# Patient Record
Sex: FEMALE | Race: ASIAN | NOT HISPANIC OR LATINO | Employment: OTHER | URBAN - METROPOLITAN AREA
[De-identification: names, ages, dates, MRNs, and addresses within clinical notes are randomized per-mention and may not be internally consistent; named-entity substitution may affect disease eponyms.]

---

## 2023-11-22 ENCOUNTER — HOSPITAL ENCOUNTER (OUTPATIENT)
Dept: MAMMOGRAPHY | Facility: HOSPITAL | Age: 48
Discharge: HOME | End: 2023-11-22
Admitting: STUDENT IN AN ORGANIZED HEALTH CARE EDUCATION/TRAINING PROGRAM
Payer: COMMERCIAL

## 2023-11-22 DIAGNOSIS — R92.8 ABNORMAL MAMMOGRAM: ICD-10-CM

## 2023-11-22 PROCEDURE — 77065 DX MAMMO INCL CAD UNI: CPT | Performed by: RADIOLOGY

## 2023-11-22 PROCEDURE — 77065 DX MAMMO INCL CAD UNI: CPT

## 2024-01-24 ENCOUNTER — TELEPHONE (OUTPATIENT)
Dept: OBSTETRICS AND GYNECOLOGY | Facility: CLINIC | Age: 49
End: 2024-01-24
Payer: COMMERCIAL

## 2024-01-24 RX ORDER — METRONIDAZOLE 500 MG/1
500 TABLET ORAL 2 TIMES DAILY
Qty: 14 TABLET | Refills: 0 | Status: SHIPPED | OUTPATIENT
Start: 2024-01-24 | End: 2024-01-31

## 2024-01-24 NOTE — TELEPHONE ENCOUNTER
Caller: Judith Sandoval    Relationship: Self    Best call back number: [phone]    Requested Prescriptions:   metroNIDAZOLE (Flagyl) 500 MG tablet      Pharmacy where request should be sent:      [id number] [place] [id number]  AT [facility] - [phone] [facility] [phone]  [phone]     Last office visit with prescribing clinician: 7/20/2023   Last telemedicine visit with prescribing clinician: Visit date not found   Next office visit with prescribing clinician: 2/6/2024     Additional details provided by patient:     Does the patient have less than a 3 day supply:  [x] Yes  [] No    Would you like a call back once the refill request has been completed: [x] Yes [] No    If the office needs to give you a call back, can they leave a voicemail: [x] Yes [] No    Amanda Toro Rep   01/24/24 08:54 EST

## 2024-01-24 NOTE — TELEPHONE ENCOUNTER
Called and spoke with patient.  She states that her symptoms have been present for ~1 week.  She reports she is having abnormal vaginal discharge and odor (is of fishy quality).   No vaginal itching/irritation, no urinary changes/symptoms, no recent product changes, no recent antibiotics.  Confirmed [facility] on [facility] pharmacy.  Patient would like to know if something can be called in for her.

## 2024-01-24 NOTE — TELEPHONE ENCOUNTER
Requested prescription of Flagyl twice a day for 1 week for BV has been sent to her listed pharmacy.  Thanks,  Jackie Ross MD

## 2024-01-24 NOTE — TELEPHONE ENCOUNTER
Called and spoke with patient, informed her of Rx, she verified understanding. Reminded to avoid alcohol while on medication.

## 2024-02-05 NOTE — PROGRESS NOTES
Subjective   Chief Complaint   Patient presents with    Gynecologic Exam    recurrent yeast      Would like swab      Judith Sandoval is a 48 y.o. year old  presenting to be seen for her annual exam. She is doing well but would like a vaginal swab due to recurrent BV. She finished her regimen of Flagyl about a week ago. She has also been taking the weekly Diflucan for recurrent yeast infections, but felt minimal improvement. Her last dose of Diflucan was about 5 days ago. She reports some intermenstrual spotting that is not consistent, and happens sporadically. She would also like to leave a urine sample for UTI evaluation.     Follow-up mammogram scheduled for March  Up-to-date on colonoscopy    SEXUAL Hx:  She is currently sexually active.  In the past year there there has been NO new sexual partners.    She would like to be screened for STD's at today's exam.  Current birth control method: tubal sterilization.  She is happy with her current method of contraception and does not want to discuss alternative methods of contraception.  MENSTRUAL Hx:  No LMP recorded. (Menstrual status: Tubal ligation).  In the past 6 months her cycles have been regular, predictable and occur monthly.  Her menstrual flow is typically normal.   Each month on average there are roughly 2 day(s) of very heavy flow.  Intermenstrual bleeding is present - see HPI .    Post-coital bleeding is absent.  Dysmenorrhea: Mild   PMS: none  Her cycles are not a source of concern for her that she wishes to discuss today.  HEALTH Hx:  She exercises regularly: yes.  She wears her seat belt: yes.  She has concerns about domestic violence: no.    The following portions of the patient's history were reviewed and updated as appropriate:problem list, current medications, allergies, past family history, past medical history, past social history, and past surgical history.    Social History    Tobacco Use      Smoking status: Former         "Packs/day: .25        Types: Cigarettes        Quit date: 2016        Years since quittin.2      Smokeless tobacco: Never      Tobacco comments: smokes e cigarette         Objective   /72 (BP Location: Right arm, Patient Position: Sitting, Cuff Size: Adult)   Ht 165.1 cm (65\")   Wt 60.7 kg (133 lb 12.8 oz)   BMI 22.27 kg/m²     General:  well developed; well nourished  no acute distress   Skin:  No suspicious lesions seen   Thyroid: not examined   Breasts:  Examined in supine position  Symmetric without masses or skin dimpling  Nipples normal without inversion, lesions or discharge  There are no palpable axillary nodes   Pelvis: Clinical staff was present for exam  External genitalia:  normal appearance of the external genitalia including Bartholin's and Flasher's glands.  :  urethral meatus normal;  Vaginal:  normal pink mucosa without prolapse or lesions.  Cervix:  normal appearance.  Uterus:  normal size, shape and consistency.  Adnexa:  normal bimanual exam of the adnexa.        Assessment   Normal GYN exam  Vaginal discharge   Recurrent BV      Plan   Pap and HPV were done today.  If she does not receive the results of the Pap within 2 weeks  time, she was instructed to call to find out the results.  I explained to Judith that the recommendations for Pap smear interval in a low risk patient's has lengthened to 5 years time if both cytology and HPV testing were normal.  I encouraged her to be seen yearly for a full physical exam including breast and pelvic exam even during the off years when PAP's will not be performed.   She was encouraged to get yearly mammograms.  She should report any palpable breast lump(s) or skin changes regardless of mammographic findings.  I explained to Judith that notification regarding her mammogram results will come from the center performing the study.  Our office will not be routinely calling with mammogram results.  It is her responsibility to make sure that the " results from the mammogram are communicated to her by the breast center.  If she has any questions about the results, she is welcome to call our office anytime.  Leukorrhea swab collected and STD screening performed for vaginal discharge.  Will call patient with any abnormal results, and treat accordingly.  [place] information and handout given in clinic for BV prevention.   UA with reflex culture ordered per patient request.   Discussed if periods become heavier more frequent, to call the office for repeat evaluation.  Patient voiced understanding.  The importance of keeping all planned follow-up and taking all medications as prescribed was emphasized.  Follow up for annual exam in 1 year or sooner PRN     No orders of the defined types were placed in this encounter.         This note was electronically signed.    Jackie Ross MD   February 6, 2024

## 2024-02-06 ENCOUNTER — OFFICE VISIT (OUTPATIENT)
Dept: OBSTETRICS AND GYNECOLOGY | Facility: CLINIC | Age: 49
End: 2024-02-06
Payer: COMMERCIAL

## 2024-02-06 VITALS
BODY MASS INDEX: 22.29 KG/M2 | HEIGHT: 65 IN | SYSTOLIC BLOOD PRESSURE: 112 MMHG | DIASTOLIC BLOOD PRESSURE: 72 MMHG | WEIGHT: 133.8 LBS

## 2024-02-06 DIAGNOSIS — N89.8 VAGINAL DISCHARGE: ICD-10-CM

## 2024-02-06 DIAGNOSIS — Z01.419 WOMEN'S ANNUAL ROUTINE GYNECOLOGICAL EXAMINATION: Primary | ICD-10-CM

## 2024-02-06 DIAGNOSIS — Z01.419 PAP TEST, AS PART OF ROUTINE GYNECOLOGICAL EXAMINATION: ICD-10-CM

## 2024-02-06 LAB — HOLD SPECIMEN: NORMAL

## 2024-02-06 PROCEDURE — 99396 PREV VISIT EST AGE 40-64: CPT | Performed by: STUDENT IN AN ORGANIZED HEALTH CARE EDUCATION/TRAINING PROGRAM

## 2024-02-06 PROCEDURE — 81003 URINALYSIS AUTO W/O SCOPE: CPT | Performed by: STUDENT IN AN ORGANIZED HEALTH CARE EDUCATION/TRAINING PROGRAM

## 2024-02-06 PROCEDURE — 99213 OFFICE O/P EST LOW 20 MIN: CPT | Performed by: STUDENT IN AN ORGANIZED HEALTH CARE EDUCATION/TRAINING PROGRAM

## 2024-02-06 PROCEDURE — 1159F MED LIST DOCD IN RCRD: CPT | Performed by: STUDENT IN AN ORGANIZED HEALTH CARE EDUCATION/TRAINING PROGRAM

## 2024-02-06 PROCEDURE — 1160F RVW MEDS BY RX/DR IN RCRD: CPT | Performed by: STUDENT IN AN ORGANIZED HEALTH CARE EDUCATION/TRAINING PROGRAM

## 2024-02-06 PROCEDURE — 99459 PELVIC EXAMINATION: CPT | Performed by: STUDENT IN AN ORGANIZED HEALTH CARE EDUCATION/TRAINING PROGRAM

## 2024-02-07 LAB
BILIRUB UR QL STRIP: NEGATIVE
CLARITY UR: CLEAR
COLOR UR: YELLOW
GLUCOSE UR STRIP-MCNC: NEGATIVE MG/DL
HGB UR QL STRIP.AUTO: NEGATIVE
KETONES UR QL STRIP: NEGATIVE
LEUKOCYTE ESTERASE UR QL STRIP.AUTO: NEGATIVE
NITRITE UR QL STRIP: NEGATIVE
PH UR STRIP.AUTO: 6.5 [PH] (ref 5–8)
PROT UR QL STRIP: ABNORMAL
SP GR UR STRIP: 1.03 (ref 1–1.03)
UROBILINOGEN UR QL STRIP: ABNORMAL

## 2024-02-09 LAB — REF LAB TEST METHOD: NORMAL

## 2024-03-05 RX ORDER — SUMATRIPTAN 100 MG/1
TABLET, FILM COATED ORAL
Qty: 9 TABLET | Refills: 11 | Status: SHIPPED | OUTPATIENT
Start: 2024-03-05

## 2024-03-11 ENCOUNTER — HOSPITAL ENCOUNTER (OUTPATIENT)
Dept: MAMMOGRAPHY | Facility: HOSPITAL | Age: 49
Discharge: HOME | End: 2024-03-11
Admitting: RADIOLOGY
Payer: COMMERCIAL

## 2024-03-11 DIAGNOSIS — R92.8 ABNORMAL MAMMOGRAM: ICD-10-CM

## 2024-03-11 DIAGNOSIS — R92.8 ABNORMAL MAMMOGRAM: Primary | ICD-10-CM

## 2024-03-11 PROCEDURE — G0279 TOMOSYNTHESIS, MAMMO: HCPCS

## 2024-03-11 PROCEDURE — 77066 DX MAMMO INCL CAD BI: CPT

## 2024-03-11 PROCEDURE — 77062 BREAST TOMOSYNTHESIS BI: CPT | Performed by: RADIOLOGY

## 2024-03-11 PROCEDURE — 77066 DX MAMMO INCL CAD BI: CPT | Performed by: RADIOLOGY

## 2024-03-28 ENCOUNTER — ANESTHESIA (OUTPATIENT)
Dept: PERIOP | Facility: HOSPITAL | Age: 49
End: 2024-03-28
Payer: COMMERCIAL

## 2024-03-28 ENCOUNTER — HOSPITAL ENCOUNTER (OUTPATIENT)
Facility: HOSPITAL | Age: 49
Setting detail: OBSERVATION
Discharge: HOME | End: 2024-03-29
Attending: EMERGENCY MEDICINE | Admitting: INTERNAL MEDICINE
Payer: COMMERCIAL

## 2024-03-28 ENCOUNTER — APPOINTMENT (OUTPATIENT)
Dept: GENERAL RADIOLOGY | Facility: HOSPITAL | Age: 49
End: 2024-03-28
Payer: COMMERCIAL

## 2024-03-28 ENCOUNTER — APPOINTMENT (OUTPATIENT)
Dept: CT IMAGING | Facility: HOSPITAL | Age: 49
End: 2024-03-28
Payer: COMMERCIAL

## 2024-03-28 ENCOUNTER — ANESTHESIA EVENT (OUTPATIENT)
Dept: PERIOP | Facility: HOSPITAL | Age: 49
End: 2024-03-28
Payer: COMMERCIAL

## 2024-03-28 DIAGNOSIS — N75.1 BARTHOLIN'S GLAND ABSCESS: ICD-10-CM

## 2024-03-28 DIAGNOSIS — N23 RENAL COLIC ON LEFT SIDE: ICD-10-CM

## 2024-03-28 DIAGNOSIS — N20.1 URETERAL STONE: Primary | ICD-10-CM

## 2024-03-28 DIAGNOSIS — R52 INTRACTABLE PAIN: ICD-10-CM

## 2024-03-28 DIAGNOSIS — N20.1 URETEROLITHIASIS: ICD-10-CM

## 2024-03-28 LAB
ALBUMIN SERPL-MCNC: 4.4 G/DL (ref 3.5–5.2)
ALBUMIN/GLOB SERPL: 1.6 G/DL
ALP SERPL-CCNC: 50 U/L (ref 39–117)
ALT SERPL W P-5'-P-CCNC: 7 U/L (ref 1–33)
ANION GAP SERPL CALCULATED.3IONS-SCNC: 10 MMOL/L (ref 5–15)
AST SERPL-CCNC: 14 U/L (ref 1–32)
B-HCG UR QL: NEGATIVE
BACTERIA UR QL AUTO: ABNORMAL /HPF
BASOPHILS # BLD AUTO: 0.02 10*3/MM3 (ref 0–0.2)
BASOPHILS NFR BLD AUTO: 0.4 % (ref 0–1.5)
BILIRUB SERPL-MCNC: 0.6 MG/DL (ref 0–1.2)
BILIRUB UR QL STRIP: NEGATIVE
BUN SERPL-MCNC: 11 MG/DL (ref 6–20)
BUN/CREAT SERPL: 15.7 (ref 7–25)
CALCIUM SPEC-SCNC: 8.7 MG/DL (ref 8.6–10.5)
CHLORIDE SERPL-SCNC: 105 MMOL/L (ref 98–107)
CLARITY UR: CLEAR
CO2 SERPL-SCNC: 26 MMOL/L (ref 22–29)
COLOR UR: YELLOW
CREAT SERPL-MCNC: 0.7 MG/DL (ref 0.57–1)
DEPRECATED RDW RBC AUTO: 47.8 FL (ref 37–54)
EGFRCR SERPLBLD CKD-EPI 2021: 108.2 ML/MIN/1.73
EOSINOPHIL # BLD AUTO: 0.09 10*3/MM3 (ref 0–0.4)
EOSINOPHIL NFR BLD AUTO: 2 % (ref 0.3–6.2)
ERYTHROCYTE [DISTWIDTH] IN BLOOD BY AUTOMATED COUNT: 18 % (ref 12.3–15.4)
EXPIRATION DATE: NORMAL
GLOBULIN UR ELPH-MCNC: 2.8 GM/DL
GLUCOSE SERPL-MCNC: 87 MG/DL (ref 65–99)
GLUCOSE UR STRIP-MCNC: NEGATIVE MG/DL
HCT VFR BLD AUTO: 32.9 % (ref 34–46.6)
HGB BLD-MCNC: 9.7 G/DL (ref 12–15.9)
HGB UR QL STRIP.AUTO: ABNORMAL
HOLD SPECIMEN: NORMAL
HYALINE CASTS UR QL AUTO: ABNORMAL /LPF
IMM GRANULOCYTES # BLD AUTO: 0.01 10*3/MM3 (ref 0–0.05)
IMM GRANULOCYTES NFR BLD AUTO: 0.2 % (ref 0–0.5)
INTERNAL NEGATIVE CONTROL: NORMAL
INTERNAL POSITIVE CONTROL: NORMAL
KETONES UR QL STRIP: ABNORMAL
LEUKOCYTE ESTERASE UR QL STRIP.AUTO: NEGATIVE
LIPASE SERPL-CCNC: 35 U/L (ref 13–60)
LYMPHOCYTES # BLD AUTO: 1.33 10*3/MM3 (ref 0.7–3.1)
LYMPHOCYTES NFR BLD AUTO: 29.3 % (ref 19.6–45.3)
Lab: NORMAL
MCH RBC QN AUTO: 21.8 PG (ref 26.6–33)
MCHC RBC AUTO-ENTMCNC: 29.5 G/DL (ref 31.5–35.7)
MCV RBC AUTO: 74.1 FL (ref 79–97)
MONOCYTES # BLD AUTO: 0.26 10*3/MM3 (ref 0.1–0.9)
MONOCYTES NFR BLD AUTO: 5.7 % (ref 5–12)
NEUTROPHILS NFR BLD AUTO: 2.83 10*3/MM3 (ref 1.7–7)
NEUTROPHILS NFR BLD AUTO: 62.4 % (ref 42.7–76)
NITRITE UR QL STRIP: NEGATIVE
NRBC BLD AUTO-RTO: 0 /100 WBC (ref 0–0.2)
PH UR STRIP.AUTO: 6 [PH] (ref 5–8)
PLATELET # BLD AUTO: 361 10*3/MM3 (ref 140–450)
PMV BLD AUTO: 9.4 FL (ref 6–12)
POTASSIUM SERPL-SCNC: 3.7 MMOL/L (ref 3.5–5.2)
PROT SERPL-MCNC: 7.2 G/DL (ref 6–8.5)
PROT UR QL STRIP: ABNORMAL
RBC # BLD AUTO: 4.44 10*6/MM3 (ref 3.77–5.28)
RBC # UR STRIP: ABNORMAL /HPF
REF LAB TEST METHOD: ABNORMAL
SODIUM SERPL-SCNC: 141 MMOL/L (ref 136–145)
SP GR UR STRIP: 1.02 (ref 1–1.03)
SQUAMOUS #/AREA URNS HPF: ABNORMAL /HPF
UROBILINOGEN UR QL STRIP: ABNORMAL
WBC # UR STRIP: ABNORMAL /HPF
WBC NRBC COR # BLD AUTO: 4.54 10*3/MM3 (ref 3.4–10.8)
WHOLE BLOOD HOLD COAG: NORMAL
WHOLE BLOOD HOLD SPECIMEN: NORMAL

## 2024-03-28 PROCEDURE — C2617 STENT, NON-COR, TEM W/O DEL: HCPCS | Performed by: UROLOGY

## 2024-03-28 PROCEDURE — 25810000003 LACTATED RINGERS PER 1000 ML: Performed by: ANESTHESIOLOGY

## 2024-03-28 PROCEDURE — 99222 1ST HOSP IP/OBS MODERATE 55: CPT | Performed by: INTERNAL MEDICINE

## 2024-03-28 PROCEDURE — G0378 HOSPITAL OBSERVATION PER HR: HCPCS

## 2024-03-28 PROCEDURE — 25010000002 FENTANYL CITRATE (PF) 100 MCG/2ML SOLUTION: Performed by: ANESTHESIOLOGY

## 2024-03-28 PROCEDURE — 76000 FLUOROSCOPY <1 HR PHYS/QHP: CPT

## 2024-03-28 PROCEDURE — 25810000003 SODIUM CHLORIDE 0.9 % SOLUTION 250 ML FLEX CONT: Performed by: PHYSICIAN ASSISTANT

## 2024-03-28 PROCEDURE — 25010000002 ONDANSETRON PER 1 MG: Performed by: PHYSICIAN ASSISTANT

## 2024-03-28 PROCEDURE — C1769 GUIDE WIRE: HCPCS | Performed by: UROLOGY

## 2024-03-28 PROCEDURE — 83690 ASSAY OF LIPASE: CPT | Performed by: EMERGENCY MEDICINE

## 2024-03-28 PROCEDURE — 25010000002 HYDROMORPHONE PER 4 MG: Performed by: EMERGENCY MEDICINE

## 2024-03-28 PROCEDURE — 25010000002 HYDROMORPHONE PER 4 MG: Performed by: INTERNAL MEDICINE

## 2024-03-28 PROCEDURE — 80053 COMPREHEN METABOLIC PANEL: CPT | Performed by: EMERGENCY MEDICINE

## 2024-03-28 PROCEDURE — 25010000002 ONDANSETRON PER 1 MG: Performed by: UROLOGY

## 2024-03-28 PROCEDURE — 81025 URINE PREGNANCY TEST: CPT | Performed by: EMERGENCY MEDICINE

## 2024-03-28 PROCEDURE — 74176 CT ABD & PELVIS W/O CONTRAST: CPT

## 2024-03-28 PROCEDURE — 81001 URINALYSIS AUTO W/SCOPE: CPT | Performed by: EMERGENCY MEDICINE

## 2024-03-28 PROCEDURE — 96375 TX/PRO/DX INJ NEW DRUG ADDON: CPT

## 2024-03-28 PROCEDURE — 25810000003 LACTATED RINGERS SOLUTION: Performed by: PHYSICIAN ASSISTANT

## 2024-03-28 PROCEDURE — 25010000002 LEVOFLOXACIN PER 250 MG: Performed by: UROLOGY

## 2024-03-28 PROCEDURE — 99285 EMERGENCY DEPT VISIT HI MDM: CPT

## 2024-03-28 PROCEDURE — 25010000002 PROPOFOL 10 MG/ML EMULSION: Performed by: ANESTHESIOLOGY

## 2024-03-28 PROCEDURE — 25010000002 HYDROMORPHONE PER 4 MG: Performed by: UROLOGY

## 2024-03-28 PROCEDURE — 96361 HYDRATE IV INFUSION ADD-ON: CPT

## 2024-03-28 PROCEDURE — 96374 THER/PROPH/DIAG INJ IV PUSH: CPT

## 2024-03-28 PROCEDURE — 96376 TX/PRO/DX INJ SAME DRUG ADON: CPT

## 2024-03-28 PROCEDURE — 25010000002 ONDANSETRON PER 1 MG: Performed by: ANESTHESIOLOGY

## 2024-03-28 PROCEDURE — 25010000002 KETOROLAC TROMETHAMINE PER 15 MG: Performed by: PHYSICIAN ASSISTANT

## 2024-03-28 PROCEDURE — 25010000002 ONDANSETRON PER 1 MG: Performed by: INTERNAL MEDICINE

## 2024-03-28 PROCEDURE — 25010000002 DEXAMETHASONE PER 1 MG: Performed by: ANESTHESIOLOGY

## 2024-03-28 PROCEDURE — 85025 COMPLETE CBC W/AUTO DIFF WBC: CPT | Performed by: EMERGENCY MEDICINE

## 2024-03-28 DEVICE — URETERAL STENT
Type: IMPLANTABLE DEVICE | Site: URETER | Status: FUNCTIONAL
Brand: PERCUFLEX™ PLUS

## 2024-03-28 RX ORDER — FENTANYL CITRATE 50 UG/ML
INJECTION, SOLUTION INTRAMUSCULAR; INTRAVENOUS AS NEEDED
Status: DISCONTINUED | OUTPATIENT
Start: 2024-03-28 | End: 2024-03-28 | Stop reason: SURG

## 2024-03-28 RX ORDER — SODIUM CHLORIDE, SODIUM LACTATE, POTASSIUM CHLORIDE, CALCIUM CHLORIDE 600; 310; 30; 20 MG/100ML; MG/100ML; MG/100ML; MG/100ML
9 INJECTION, SOLUTION INTRAVENOUS CONTINUOUS
Status: DISCONTINUED | OUTPATIENT
Start: 2024-03-28 | End: 2024-03-29 | Stop reason: HOSPADM

## 2024-03-28 RX ORDER — DIAZEPAM 5 MG/1
5 TABLET ORAL EVERY 8 HOURS PRN
Status: DISCONTINUED | OUTPATIENT
Start: 2024-03-28 | End: 2024-03-29 | Stop reason: HOSPADM

## 2024-03-28 RX ORDER — FAMOTIDINE 10 MG/ML
20 INJECTION, SOLUTION INTRAVENOUS ONCE
Status: DISCONTINUED | OUTPATIENT
Start: 2024-03-28 | End: 2024-03-28 | Stop reason: HOSPADM

## 2024-03-28 RX ORDER — PHENAZOPYRIDINE HYDROCHLORIDE 100 MG/1
200 TABLET, FILM COATED ORAL ONCE
Status: COMPLETED | OUTPATIENT
Start: 2024-03-28 | End: 2024-03-28

## 2024-03-28 RX ORDER — AMOXICILLIN 250 MG
2 CAPSULE ORAL 2 TIMES DAILY
Status: DISCONTINUED | OUTPATIENT
Start: 2024-03-28 | End: 2024-03-29 | Stop reason: HOSPADM

## 2024-03-28 RX ORDER — HYDROMORPHONE HYDROCHLORIDE 1 MG/ML
0.5 INJECTION, SOLUTION INTRAMUSCULAR; INTRAVENOUS; SUBCUTANEOUS
Status: DISCONTINUED | OUTPATIENT
Start: 2024-03-28 | End: 2024-03-29 | Stop reason: HOSPADM

## 2024-03-28 RX ORDER — NALOXONE HCL 0.4 MG/ML
0.4 VIAL (ML) INJECTION
Status: DISCONTINUED | OUTPATIENT
Start: 2024-03-28 | End: 2024-03-29 | Stop reason: HOSPADM

## 2024-03-28 RX ORDER — HYDROMORPHONE HYDROCHLORIDE 1 MG/ML
0.5 INJECTION, SOLUTION INTRAMUSCULAR; INTRAVENOUS; SUBCUTANEOUS
Status: DISCONTINUED | OUTPATIENT
Start: 2024-03-28 | End: 2024-03-28 | Stop reason: HOSPADM

## 2024-03-28 RX ORDER — LIDOCAINE HYDROCHLORIDE 20 MG/ML
JELLY TOPICAL AS NEEDED
Status: DISCONTINUED | OUTPATIENT
Start: 2024-03-28 | End: 2024-03-28 | Stop reason: HOSPADM

## 2024-03-28 RX ORDER — TAMSULOSIN HYDROCHLORIDE 0.4 MG/1
0.4 CAPSULE ORAL DAILY
Status: DISCONTINUED | OUTPATIENT
Start: 2024-03-28 | End: 2024-03-29 | Stop reason: HOSPADM

## 2024-03-28 RX ORDER — MAGNESIUM HYDROXIDE 1200 MG/15ML
LIQUID ORAL AS NEEDED
Status: DISCONTINUED | OUTPATIENT
Start: 2024-03-28 | End: 2024-03-28 | Stop reason: HOSPADM

## 2024-03-28 RX ORDER — HYDROMORPHONE HYDROCHLORIDE 1 MG/ML
0.5 INJECTION, SOLUTION INTRAMUSCULAR; INTRAVENOUS; SUBCUTANEOUS ONCE
Status: COMPLETED | OUTPATIENT
Start: 2024-03-28 | End: 2024-03-28

## 2024-03-28 RX ORDER — FAMOTIDINE 20 MG/1
20 TABLET, FILM COATED ORAL ONCE
Status: DISCONTINUED | OUTPATIENT
Start: 2024-03-28 | End: 2024-03-28 | Stop reason: HOSPADM

## 2024-03-28 RX ORDER — PHENAZOPYRIDINE HYDROCHLORIDE 100 MG/1
100 TABLET, FILM COATED ORAL
Status: DISCONTINUED | OUTPATIENT
Start: 2024-03-29 | End: 2024-03-29 | Stop reason: HOSPADM

## 2024-03-28 RX ORDER — SODIUM CHLORIDE 0.9 % (FLUSH) 0.9 %
10 SYRINGE (ML) INJECTION EVERY 12 HOURS SCHEDULED
Status: DISCONTINUED | OUTPATIENT
Start: 2024-03-28 | End: 2024-03-28 | Stop reason: HOSPADM

## 2024-03-28 RX ORDER — BISACODYL 10 MG
10 SUPPOSITORY, RECTAL RECTAL DAILY PRN
Status: DISCONTINUED | OUTPATIENT
Start: 2024-03-28 | End: 2024-03-29 | Stop reason: HOSPADM

## 2024-03-28 RX ORDER — AMOXICILLIN 250 MG
2 CAPSULE ORAL 2 TIMES DAILY
Qty: 20 TABLET | Refills: 0 | Status: SHIPPED | OUTPATIENT
Start: 2024-03-29

## 2024-03-28 RX ORDER — ACETAMINOPHEN 325 MG/1
650 TABLET ORAL EVERY 4 HOURS PRN
Status: DISCONTINUED | OUTPATIENT
Start: 2024-03-28 | End: 2024-03-29 | Stop reason: HOSPADM

## 2024-03-28 RX ORDER — LIDOCAINE HYDROCHLORIDE 10 MG/ML
0.5 INJECTION, SOLUTION EPIDURAL; INFILTRATION; INTRACAUDAL; PERINEURAL ONCE AS NEEDED
Status: DISCONTINUED | OUTPATIENT
Start: 2024-03-28 | End: 2024-03-28 | Stop reason: HOSPADM

## 2024-03-28 RX ORDER — HYDROCODONE BITARTRATE AND ACETAMINOPHEN 7.5; 325 MG/1; MG/1
1 TABLET ORAL EVERY 4 HOURS PRN
Status: DISCONTINUED | OUTPATIENT
Start: 2024-03-28 | End: 2024-03-29 | Stop reason: HOSPADM

## 2024-03-28 RX ORDER — SODIUM CHLORIDE 0.9 % (FLUSH) 0.9 %
10 SYRINGE (ML) INJECTION AS NEEDED
Status: DISCONTINUED | OUTPATIENT
Start: 2024-03-28 | End: 2024-03-28 | Stop reason: HOSPADM

## 2024-03-28 RX ORDER — SODIUM CHLORIDE 9 MG/ML
40 INJECTION, SOLUTION INTRAVENOUS AS NEEDED
Status: DISCONTINUED | OUTPATIENT
Start: 2024-03-28 | End: 2024-03-28 | Stop reason: HOSPADM

## 2024-03-28 RX ORDER — BISACODYL 5 MG/1
5 TABLET, DELAYED RELEASE ORAL DAILY PRN
Status: DISCONTINUED | OUTPATIENT
Start: 2024-03-28 | End: 2024-03-29 | Stop reason: HOSPADM

## 2024-03-28 RX ORDER — DROPERIDOL 2.5 MG/ML
0.62 INJECTION, SOLUTION INTRAMUSCULAR; INTRAVENOUS ONCE AS NEEDED
Status: DISCONTINUED | OUTPATIENT
Start: 2024-03-28 | End: 2024-03-28 | Stop reason: HOSPADM

## 2024-03-28 RX ORDER — PROPOFOL 10 MG/ML
VIAL (ML) INTRAVENOUS AS NEEDED
Status: DISCONTINUED | OUTPATIENT
Start: 2024-03-28 | End: 2024-03-28 | Stop reason: SURG

## 2024-03-28 RX ORDER — SODIUM CHLORIDE 0.9 % (FLUSH) 0.9 %
10 SYRINGE (ML) INJECTION AS NEEDED
Status: DISCONTINUED | OUTPATIENT
Start: 2024-03-28 | End: 2024-03-29 | Stop reason: HOSPADM

## 2024-03-28 RX ORDER — POLYETHYLENE GLYCOL 3350 17 G/17G
17 POWDER, FOR SOLUTION ORAL DAILY PRN
Status: DISCONTINUED | OUTPATIENT
Start: 2024-03-28 | End: 2024-03-29 | Stop reason: HOSPADM

## 2024-03-28 RX ORDER — MIDAZOLAM HYDROCHLORIDE 1 MG/ML
1 INJECTION INTRAMUSCULAR; INTRAVENOUS
Status: DISCONTINUED | OUTPATIENT
Start: 2024-03-28 | End: 2024-03-28 | Stop reason: HOSPADM

## 2024-03-28 RX ORDER — HYDROMORPHONE HYDROCHLORIDE 1 MG/ML
0.25 INJECTION, SOLUTION INTRAMUSCULAR; INTRAVENOUS; SUBCUTANEOUS ONCE
Status: COMPLETED | OUTPATIENT
Start: 2024-03-28 | End: 2024-03-28

## 2024-03-28 RX ORDER — LEVOFLOXACIN 5 MG/ML
500 INJECTION, SOLUTION INTRAVENOUS ONCE
Status: COMPLETED | OUTPATIENT
Start: 2024-03-28 | End: 2024-03-28

## 2024-03-28 RX ORDER — DEXAMETHASONE SODIUM PHOSPHATE 4 MG/ML
INJECTION, SOLUTION INTRA-ARTICULAR; INTRALESIONAL; INTRAMUSCULAR; INTRAVENOUS; SOFT TISSUE AS NEEDED
Status: DISCONTINUED | OUTPATIENT
Start: 2024-03-28 | End: 2024-03-28 | Stop reason: SURG

## 2024-03-28 RX ORDER — KETOROLAC TROMETHAMINE 15 MG/ML
15 INJECTION, SOLUTION INTRAMUSCULAR; INTRAVENOUS ONCE
Status: COMPLETED | OUTPATIENT
Start: 2024-03-28 | End: 2024-03-28

## 2024-03-28 RX ORDER — SODIUM CHLORIDE 450 MG/100ML
100 INJECTION, SOLUTION INTRAVENOUS CONTINUOUS
Status: DISCONTINUED | OUTPATIENT
Start: 2024-03-28 | End: 2024-03-29 | Stop reason: HOSPADM

## 2024-03-28 RX ORDER — ONDANSETRON 2 MG/ML
4 INJECTION INTRAMUSCULAR; INTRAVENOUS ONCE
Status: COMPLETED | OUTPATIENT
Start: 2024-03-28 | End: 2024-03-28

## 2024-03-28 RX ORDER — SODIUM CHLORIDE 0.9 % (FLUSH) 0.9 %
10 SYRINGE (ML) INJECTION EVERY 12 HOURS SCHEDULED
Status: DISCONTINUED | OUTPATIENT
Start: 2024-03-28 | End: 2024-03-29 | Stop reason: HOSPADM

## 2024-03-28 RX ORDER — ONDANSETRON 2 MG/ML
INJECTION INTRAMUSCULAR; INTRAVENOUS AS NEEDED
Status: DISCONTINUED | OUTPATIENT
Start: 2024-03-28 | End: 2024-03-28 | Stop reason: SURG

## 2024-03-28 RX ORDER — HYDROMORPHONE HYDROCHLORIDE 1 MG/ML
0.5 INJECTION, SOLUTION INTRAMUSCULAR; INTRAVENOUS; SUBCUTANEOUS EVERY 4 HOURS PRN
Status: DISCONTINUED | OUTPATIENT
Start: 2024-03-28 | End: 2024-03-28

## 2024-03-28 RX ORDER — PHENAZOPYRIDINE HYDROCHLORIDE 100 MG/1
100 TABLET, FILM COATED ORAL
Qty: 6 TABLET | Refills: 0 | Status: SHIPPED | OUTPATIENT
Start: 2024-03-29 | End: 2024-03-31

## 2024-03-28 RX ORDER — ONDANSETRON 4 MG/1
4 TABLET, ORALLY DISINTEGRATING ORAL EVERY 6 HOURS PRN
Status: DISCONTINUED | OUTPATIENT
Start: 2024-03-28 | End: 2024-03-29 | Stop reason: HOSPADM

## 2024-03-28 RX ORDER — FENTANYL CITRATE 50 UG/ML
50 INJECTION, SOLUTION INTRAMUSCULAR; INTRAVENOUS
Status: DISCONTINUED | OUTPATIENT
Start: 2024-03-28 | End: 2024-03-28 | Stop reason: HOSPADM

## 2024-03-28 RX ORDER — ONDANSETRON 2 MG/ML
4 INJECTION INTRAMUSCULAR; INTRAVENOUS EVERY 6 HOURS PRN
Status: DISCONTINUED | OUTPATIENT
Start: 2024-03-28 | End: 2024-03-29 | Stop reason: HOSPADM

## 2024-03-28 RX ORDER — SODIUM CHLORIDE 9 MG/ML
10 INJECTION, SOLUTION INTRAMUSCULAR; INTRAVENOUS; SUBCUTANEOUS AS NEEDED
Status: DISCONTINUED | OUTPATIENT
Start: 2024-03-28 | End: 2024-03-29 | Stop reason: HOSPADM

## 2024-03-28 RX ORDER — MIDAZOLAM HYDROCHLORIDE 1 MG/ML
2 INJECTION INTRAMUSCULAR; INTRAVENOUS ONCE
Status: DISCONTINUED | OUTPATIENT
Start: 2024-03-28 | End: 2024-03-28 | Stop reason: HOSPADM

## 2024-03-28 RX ORDER — SODIUM CHLORIDE 9 MG/ML
40 INJECTION, SOLUTION INTRAVENOUS AS NEEDED
Status: DISCONTINUED | OUTPATIENT
Start: 2024-03-28 | End: 2024-03-29 | Stop reason: HOSPADM

## 2024-03-28 RX ADMIN — ONDANSETRON 4 MG: 2 INJECTION INTRAMUSCULAR; INTRAVENOUS at 13:37

## 2024-03-28 RX ADMIN — ONDANSETRON 4 MG: 2 INJECTION INTRAMUSCULAR; INTRAVENOUS at 07:33

## 2024-03-28 RX ADMIN — HYDROCODONE BITARTRATE AND ACETAMINOPHEN 1 TABLET: 7.5; 325 TABLET ORAL at 20:43

## 2024-03-28 RX ADMIN — HYDROMORPHONE HYDROCHLORIDE 0.5 MG: 1 INJECTION, SOLUTION INTRAMUSCULAR; INTRAVENOUS; SUBCUTANEOUS at 08:46

## 2024-03-28 RX ADMIN — FENTANYL CITRATE 100 MCG: 50 INJECTION, SOLUTION INTRAMUSCULAR; INTRAVENOUS at 18:58

## 2024-03-28 RX ADMIN — HYDROMORPHONE HYDROCHLORIDE 0.5 MG: 1 INJECTION, SOLUTION INTRAMUSCULAR; INTRAVENOUS; SUBCUTANEOUS at 08:21

## 2024-03-28 RX ADMIN — Medication 10 ML: at 23:58

## 2024-03-28 RX ADMIN — PROPOFOL 200 MG: 10 INJECTION, EMULSION INTRAVENOUS at 18:58

## 2024-03-28 RX ADMIN — SODIUM CHLORIDE, POTASSIUM CHLORIDE, SODIUM LACTATE AND CALCIUM CHLORIDE 1000 ML: 600; 310; 30; 20 INJECTION, SOLUTION INTRAVENOUS at 07:32

## 2024-03-28 RX ADMIN — HYDROMORPHONE HYDROCHLORIDE 0.25 MG: 1 INJECTION, SOLUTION INTRAMUSCULAR; INTRAVENOUS; SUBCUTANEOUS at 07:35

## 2024-03-28 RX ADMIN — TAMSULOSIN HYDROCHLORIDE 0.4 MG: 0.4 CAPSULE ORAL at 09:33

## 2024-03-28 RX ADMIN — HYDROMORPHONE HYDROCHLORIDE 0.5 MG: 1 INJECTION, SOLUTION INTRAMUSCULAR; INTRAVENOUS; SUBCUTANEOUS at 11:34

## 2024-03-28 RX ADMIN — SODIUM CHLORIDE, POTASSIUM CHLORIDE, SODIUM LACTATE AND CALCIUM CHLORIDE: 600; 310; 30; 20 INJECTION, SOLUTION INTRAVENOUS at 18:51

## 2024-03-28 RX ADMIN — SENNOSIDES AND DOCUSATE SODIUM 2 TABLET: 8.6; 5 TABLET ORAL at 20:43

## 2024-03-28 RX ADMIN — LEVOFLOXACIN 500 MG: 5 INJECTION, SOLUTION INTRAVENOUS at 19:01

## 2024-03-28 RX ADMIN — LIDOCAINE HYDROCHLORIDE 75 MG: 10 INJECTION, SOLUTION EPIDURAL; INFILTRATION; INTRACAUDAL; PERINEURAL at 09:35

## 2024-03-28 RX ADMIN — Medication 10 ML: at 09:50

## 2024-03-28 RX ADMIN — SENNOSIDES AND DOCUSATE SODIUM 2 TABLET: 8.6; 5 TABLET ORAL at 09:32

## 2024-03-28 RX ADMIN — PHENAZOPYRIDINE 200 MG: 100 TABLET ORAL at 08:45

## 2024-03-28 RX ADMIN — DEXAMETHASONE SODIUM PHOSPHATE 4 MG: 4 INJECTION, SOLUTION INTRA-ARTICULAR; INTRALESIONAL; INTRAMUSCULAR; INTRAVENOUS; SOFT TISSUE at 18:58

## 2024-03-28 RX ADMIN — KETOROLAC TROMETHAMINE 15 MG: 15 INJECTION, SOLUTION INTRAMUSCULAR; INTRAVENOUS at 07:33

## 2024-03-28 RX ADMIN — SODIUM CHLORIDE 100 ML/HR: 4.5 INJECTION, SOLUTION INTRAVENOUS at 09:58

## 2024-03-28 RX ADMIN — ONDANSETRON 4 MG: 2 INJECTION INTRAMUSCULAR; INTRAVENOUS at 18:58

## 2024-03-28 RX ADMIN — HYDROMORPHONE HYDROCHLORIDE 0.25 MG: 1 INJECTION, SOLUTION INTRAMUSCULAR; INTRAVENOUS; SUBCUTANEOUS at 07:42

## 2024-03-28 NOTE — H&P
[facility] Medicine Services  HISTORY AND PHYSICAL    Patient Name: Judith Sandoval  : 1975  MRN: [id number]  Primary Care Physician: Chio Diamond PA-C  Date of admission: 3/28/2024      Subjective   Subjective     Chief Complaint:  Left flank pain     HPI:  Judith Sandoval is a 48 y.o. female in ED with persistent L flank pain.  It is better after several doses of Dilaudid and a lidocaine drip.  Plan is cystoscopy and laser lythotrypsy today .        Personal History     Past Medical History:   Diagnosis Date    Bartholin's duct cyst 2014    HPV (human papilloma virus) infection 2014    Kidney stone     Migraine            Past Surgical History:   Procedure Laterality Date    BARTHOLIN CYST MARSUPIALIZATION Left 2017     SECTION      X 4    DILATATION AND CURETTAGE      TUBAL ABDOMINAL LIGATION      TUMOR EXCISION  2011    BACK       Family History: family history is not on file. She was adopted.     Social History:  reports that she quit smoking about 7 years ago. Her smoking use included cigarettes. She has never used smokeless tobacco. She reports current alcohol use of about 2.0 standard drinks of alcohol per week. She reports current drug use. Drug: Marijuana.  Social History     Social History Narrative    Not on file       Medications:  Available home medication information reviewed.  Cranberry, Probiotic Product, SUMAtriptan, docusate sodium, ferrous gluconate, fluconazole, ibuprofen, levocetirizine, linaclotide, naproxen, tretinoin, and triamcinolone    Allergies   Allergen Reactions    Cefaclor Unknown (See Comments)    Chocolate Unknown (See Comments)    Erythromycin Unknown (See Comments)    Penicillins Unknown (See Comments)    Sulfamethoxazole-Trimethoprim Unknown (See Comments)    Tree Nut Unknown (See Comments)       Objective   Objective     Vital Signs:   Temp:  [98 °F (36.7 °C)] 98 °F (36.7 °C)  Heart Rate:  [61-72] 61  Resp:  [16]  16  BP: (114-129)/(58-80) 114/80       Physical Exam   Gen:  WD/WN  Neuro: alert and oriented, clear speech, follows commands, grossly nonfocal  HEENT:  NC/AT  Neck:  Supple, no LAD  Heart RRR   Abd:  Soft, tender left flank  Extrem:  No c/c/e      Result Review:  I have personally reviewed the results from the time of this admission to 3/28/2024 09:18 EDT and agree with these findings:  [x]  Laboratory list / accordion  []  Microbiology  [x]  Radiology  []  EKG/Telemetry   []  Cardiology/Vascular   []  Pathology  []  Old records  []  Other:  Most notable findings include: CT with l ureteral stone.  WBC 4.5       LAB RESULTS:      Lab 03/28/24  0723   WBC 4.54   HEMOGLOBIN 9.7*   HEMATOCRIT 32.9*   PLATELETS 361   NEUTROS ABS 2.83   IMMATURE GRANS (ABS) 0.01   LYMPHS ABS 1.33   MONOS ABS 0.26   EOS ABS 0.09   MCV 74.1*         Lab 03/28/24  0723   SODIUM 141   POTASSIUM 3.7   CHLORIDE 105   CO2 26.0   ANION GAP 10.0   BUN 11   CREATININE 0.70   EGFR 108.2   GLUCOSE 87   CALCIUM 8.7         Lab 03/28/24  0723   TOTAL PROTEIN 7.2   ALBUMIN 4.4   GLOBULIN 2.8   ALT (SGPT) 7   AST (SGOT) 14   BILIRUBIN 0.6   ALK PHOS 50   LIPASE 35                     UA          2/6/2024    10:35 3/28/2024    07:23   Urinalysis   Squamous Epithelial Cells, UA  7-12    Specific Gravity, UA 1.027  1.020    Ketones, UA Negative  15 mg/dL (1+)    Blood, UA Negative  Large (3+)    Leukocytes, UA Negative  Negative    Nitrite, UA Negative  Negative    RBC, UA  Too Numerous to Count    WBC, UA  0-2    Bacteria, UA  None Seen        Microbiology Results (last 10 days)       ** No results found for the last 240 hours. **            CT Abdomen Pelvis Without Contrast    Result Date: 3/28/2024  CT ABDOMEN PELVIS WO CONTRAST Date of Exam: 3/28/2024 7:50 AM EDT Indication: left flank pain. Comparison: None available. Technique: Axial CT images were obtained of the abdomen and pelvis without the administration of contrast. Reconstructed coronal and  sagittal images were also obtained. Automated exposure control and iterative construction methods were used. Findings: Lower Thorax: No focal consolidation. Liver: Scattered small low attenuating lesions, some of which are cysts, however others are difficult to characterize due to small size and noncontrast technique; in the absence of malignancy, these likely represent cysts and/or hemangiomas. Normal morphology of the liver. No evidence of steatosis. Gallbladder and bile ducts: Gallbladder is unremarkable. No biliary ductal dilatation. Pancreas: No pancreatic duct dilation. No surrounding inflammation. Spleen: Normal in size. Adrenal glands: No discrete adrenal nodule. Kidneys, ureters, and urinary bladder: 5 mm stone at the left ureterovesicular junction with mild upstream left-sided hydroureteronephrosis. Three 1 mm nonobstructing stones in the left kidney. Single 1 mm nonobstructing stone in the right kidney. No right hydronephrosis. Urinary bladder is partially decompressed. Reproductive Organs: Unremarkable. Stomach and Bowel: No evidence of bowel obstruction. Normal appendix. Lymph nodes: No pathologically enlarged lymph nodes. Vessels: No abdominal aortic aneurysm. Peritoneum and retroperitoneum: No free air or free fluid. Soft tissues: Small fat-containing umbilical hernia. Osseous structures: No acute or suspicious osseous lesions. L5-S1 degenerative disc disease.     Impression: Impression: 5 mm obstructing stone at the left ureterovesicular junction with mild upstream hydroureteronephrosis. Additional punctate nonobstructing bilateral renal stones. Nonemergent findings as above. Electronically Signed: Trung Gabriel MD  3/28/2024 8:11 AM EDT  Workstation ID: [id number]         Assessment & Plan   Assessment & Plan       Ureteral stone    47 yo with left ureteral stone  - CT:  5mm stone at L UVJ.  Several small nonobstructing stones.  Mild L hydronephrosis   - Dr Mclean informed - likely to go to OR today  if OR schedule permits.    - supportive care       DVT prophylaxis:  No DVT prophylaxis order currently exists.          CODE STATUS:    There are no questions and answers to display.       Expected Discharge   Expected discharge date/ time has not been documented.     Kacy Wheat MD  03/28/24

## 2024-03-28 NOTE — ANESTHESIA PREPROCEDURE EVALUATION
Anesthesia Evaluation     Patient summary reviewed and Nursing notes reviewed                Airway   Mallampati: II  TM distance: >3 FB  Neck ROM: full  No difficulty expected  Dental - normal exam     Pulmonary - normal exam   (+) a smoker (2016) Former,  Cardiovascular - negative cardio ROS and normal exam        Neuro/Psych- negative ROS  GI/Hepatic/Renal/Endo    (+) renal disease- stones    Musculoskeletal (-) negative ROS    Abdominal  - normal exam    Bowel sounds: normal.   Substance History   (+) drug use (THC)     OB/GYN negative ob/gyn ROS         Other                      Anesthesia Plan    ASA 2     general     intravenous induction     Anesthetic plan, risks, benefits, and alternatives have been provided, discussed and informed consent has been obtained with: patient.    CODE STATUS:    Level Of Support Discussed With: Patient  Code Status (Patient has no pulse and is not breathing): CPR (Attempt to Resuscitate)  Medical Interventions (Patient has pulse or is breathing): Full Support

## 2024-03-28 NOTE — ED NOTES
" Judith Sandoval    Nursing Report ED to Floor:  Mental status: a&oX4  Ambulatory status: up at jude  Oxygen Therapy:  ra  Cardiac Rhythm: ns  Admitted from: home  Safety Concerns:  no  Social Issues: no  ED Room #:  5    ED Nurse Phone Extension - [phone] or may call [phone].      HPI:   Chief Complaint   Patient presents with    Flank Pain       Past Medical History:  Past Medical History:   Diagnosis Date    Bartholin's duct cyst 2014    HPV (human papilloma virus) infection 2014    Kidney stone     Migraine         Past Surgical History:  Past Surgical History:   Procedure Laterality Date    BARTHOLIN CYST MARSUPIALIZATION Left 2017     SECTION      X 4    DILATATION AND CURETTAGE      TUBAL ABDOMINAL LIGATION      TUMOR EXCISION  2011    BACK        Admitting Doctor:   No admitting provider for patient encounter.    Consulting Provider(s):  Consults       Date and Time Order Name Status Description    3/28/2024  9:24 AM Inpatient Urology Consult      3/28/2024  9:17 AM Inpatient Urology Consult               Admitting Diagnosis:   The primary encounter diagnosis was Renal colic on left side. Diagnoses of Ureterolithiasis and Intractable pain were also pertinent to this visit.    Most Recent Vitals:   Vitals:    24 0710 24 0847 24 0959 24 1029   BP: 129/58 114/80 111/67 118/93   BP Location: Left arm      Patient Position: Sitting      Pulse: 72 61 65 58   Resp: 16      Temp: 98 °F (36.7 °C)      TempSrc: Oral      SpO2: 100% 100% 97% 100%   Weight: 57.6 kg (127 lb)      Height: 165.1 cm (65\")          Active LDAs/IV Access:   Lines, Drains & Airways       Active LDAs       Name Placement date Placement time Site Days    Peripheral IV 24 Right Antecubital 24  Antecubital  less than 1                    Labs (abnormal labs have a star):   Labs Reviewed   URINALYSIS W/ MICROSCOPIC IF INDICATED (NO CULTURE) - Abnormal; Notable for the following components:       " Result Value    Ketones, UA 15 mg/dL (1+) (*)     Blood, UA Large (3+) (*)     Protein, UA Trace (*)     All other components within normal limits   CBC WITH AUTO DIFFERENTIAL - Abnormal; Notable for the following components:    Hemoglobin 9.7 (*)     Hematocrit 32.9 (*)     MCV 74.1 (*)     MCH 21.8 (*)     MCHC 29.5 (*)     RDW 18.0 (*)     All other components within normal limits   URINALYSIS, MICROSCOPIC ONLY - Abnormal; Notable for the following components:    RBC, UA Too Numerous to Count (*)     Squamous Epithelial Cells, UA 7-12 (*)     All other components within normal limits   LIPASE - Normal   POCT PEFORM URINE PREGNANCY - Normal   COMPREHENSIVE METABOLIC PANEL    Narrative:     GFR Normal >60  Chronic Kidney Disease <60  Kidney Failure <15     RAINBOW DRAW    Narrative:     The following orders were created for panel order [place] Draw.  Procedure                               Abnormality         Status                     ---------                               -----------         ------                     Green Top (Gel)[[id number]]                                  Final result               Lavender Top[[id number]]                                     Final result               Gold Top - SST[[id number]]                                   Final result               Oliveira Top[[id number]]                                         In process                 Light Blue Top[[id number]]                                   Final result                 Please view results for these tests on the individual orders.   CBC AND DIFFERENTIAL    Narrative:     The following orders were created for panel order CBC & Differential.  Procedure                               Abnormality         Status                     ---------                               -----------         ------                     CBC Auto Differential[[id number]]        Abnormal            Final result               Scan Slide[[id number]]                                                                     Please view results for these tests on the individual orders.   GREEN TOP   LAVENDER TOP   GOLD TOP - SST   LIGHT BLUE TOP   GRAY TOP       Meds Given in ED:   Medications   Sodium Chloride (PF) 0.9 % 10 mL (has no administration in time range)   sodium chloride 0.9 % flush 10 mL (10 mL Intravenous Given 3/28/24 0950)   sodium chloride 0.9 % flush 10 mL (has no administration in time range)   sodium chloride 0.9 % infusion 40 mL (has no administration in time range)   sodium chloride 0.45 % infusion (100 mL/hr Intravenous New Bag 3/28/24 0958)   acetaminophen (TYLENOL) tablet 650 mg (has no administration in time range)   HYDROcodone-acetaminophen (NORCO) 7.5-325 MG per tablet 1 tablet (has no administration in time range)   HYDROmorphone (DILAUDID) injection 0.5 mg (has no administration in time range)     And   naloxone (NARCAN) injection 0.4 mg (has no administration in time range)   sennosides-docusate (PERICOLACE) 8.6-50 MG per tablet 2 tablet (2 tablets Oral Given 3/28/24 0932)     And   polyethylene glycol (MIRALAX) packet 17 g (has no administration in time range)     And   bisacodyl (DULCOLAX) EC tablet 5 mg (has no administration in time range)     And   bisacodyl (DULCOLAX) suppository 10 mg (has no administration in time range)   ondansetron ODT (ZOFRAN-ODT) disintegrating tablet 4 mg (has no administration in time range)     Or   ondansetron (ZOFRAN) injection 4 mg (has no administration in time range)   tamsulosin (FLOMAX) 24 hr capsule 0.4 mg (0.4 mg Oral Given 3/28/24 0933)   diazePAM (VALIUM) tablet 5 mg (has no administration in time range)   ketorolac (TORADOL) injection 15 mg (15 mg Intravenous Given 3/28/24 0733)   ondansetron (ZOFRAN) injection 4 mg (4 mg Intravenous Given 3/28/24 0733)   lactated ringers bolus 1,000 mL (0 mL Intravenous Stopped 3/28/24 0835)   HYDROmorphone (DILAUDID) injection 0.25 mg (0.25 mg Intravenous Given 3/28/24 0735)    HYDROmorphone (DILAUDID) injection 0.25 mg (0.25 mg Intravenous Given 3/28/24 0742)   HYDROmorphone (DILAUDID) injection 0.5 mg (0.5 mg Intravenous Given 3/28/24 0821)   HYDROmorphone (DILAUDID) injection 0.5 mg (0.5 mg Intravenous Given 3/28/24 0846)   phenazopyridine (PYRIDIUM) tablet 200 mg (200 mg Oral Given 3/28/24 0845)   lidocaine (XYLOCAINE) 1 % 75 mg in sodium chloride 0.9 % 250 mL IVPB (75 mg Intravenous Given 3/28/24 0935)     sodium chloride, 100 mL/hr, Last Rate: 100 mL/hr (03/28/24 0958)         Last NIH score:                                                          Dysphagia screening results:        Jovany Coma Scale:  No data recorded     CIWA:        Restraint Type:            Isolation Status:  No active isolations

## 2024-03-28 NOTE — Clinical Note
Level of Care: Med/Surg [1]   Diagnosis: Ureteral stone [281022]   Bed Request Comments: CDU or 5B

## 2024-03-28 NOTE — CONSULTS
Urology Consult Note    Referring Provider: Kacy Wheat MD  Reason for Consultation: Ureteral stone with renal colic    Patient Care Team:  Chio Diamond PA-C as PCP - General (Family Medicine)  Nancy Meadows APRN as Nurse Practitioner (Nurse Practitioner)    Chief complaint   Chief Complaint   Patient presents with    Flank Pain         Subjective .     History of present illness:    Judith Sandoval is a 48 y.o. female who was admitted for Ureteral stone [N20.1]. Patient was referred by Dr. Wheat for evaluation of  ureteral stone and renal colic . Patient has history of urolithiasis, previously treated with ESWL.  She presented to the emergency department for acute evaluation of left renal colic.  Pain began last night.  Mild dysuria.  CT abdomen pelvis performed showing 5 mm left distal ureteral stone.  Patient's pain has been unable to be controlled in the ER.  She is being admitted for pain control and ureteral stone.  No chest pain or shortness of air.  No fever or chills.    Review of Systems  Pertinent items are noted in HPI, all other systems reviewed and negative    History  Past Medical History:   Diagnosis Date    Bartholin's duct cyst     HPV (human papilloma virus) infection 2014    Kidney stone     Migraine    ,   Past Surgical History:   Procedure Laterality Date    BARTHOLIN CYST MARSUPIALIZATION Left 2017     SECTION      X 4    DILATATION AND CURETTAGE      TUBAL ABDOMINAL LIGATION      TUMOR EXCISION  2011    BACK   ,   Family History   Adopted: Yes   ,   Social History     Tobacco Use    Smoking status: Former     Current packs/day: 0.00     Types: Cigarettes     Quit date: 2016     Years since quittin.4    Smokeless tobacco: Never    Tobacco comments:     smokes e cigarette   Vaping Use    Vaping status: Every Day    Substances: Nicotine    Devices: Disposable   Substance Use Topics    Alcohol use: Yes     Alcohol/week: 2.0 standard drinks of alcohol      Types: 2 Drinks containing 0.5 oz of alcohol per week     Comment: SOCIAL    Drug use: Yes     Types: Marijuana     Comment: MONTHLY    , (Not in a hospital admission)  , Scheduled Meds:  senna-docusate sodium, 2 tablet, Oral, BID  sodium chloride, 10 mL, Intravenous, Q12H  tamsulosin, 0.4 mg, Oral, Daily    , Continuous Infusions:  sodium chloride, 100 mL/hr, Last Rate: 100 mL/hr (03/28/24 0958)    , PRN Meds:    acetaminophen    senna-docusate sodium **AND** polyethylene glycol **AND** bisacodyl **AND** bisacodyl    diazePAM    HYDROcodone-acetaminophen    HYDROmorphone **AND** naloxone    HYDROmorphone    ondansetron ODT **OR** ondansetron    Sodium Chloride (PF)    sodium chloride    sodium chloride and Allergies:  Cefaclor, Chocolate, Erythromycin, Penicillins, Sulfamethoxazole-trimethoprim, and Tree nut    Objective     Vital Signs   Temp:  [98 °F (36.7 °C)] 98 °F (36.7 °C)  Heart Rate:  [50-72] 50  Resp:  [16] 16  BP: (111-129)/(58-93) 116/70    Physical Exam:   General Appearance: alert, appears stated age, and cooperative  Head: normocephalic, without obvious abnormality and atraumatic  Lungs: respirations regular, respirations even, and respirations unlabored  Abdomen: soft non-tender  Extremities: no edema, no cyanosis, and no redness  Neurologic: Mental Status orientated to person, place, time and situation  Psych: normal        Results Review:   I reviewed the patient's new clinical results.  Lab Results (last 24 hours)       Procedure Component Value Units Date/Time    [place] Draw [[id number]] Collected: 03/28/24 0723    Specimen: Blood Updated: 03/28/24 0831    Narrative:      The following orders were created for panel order [place] Draw.  Procedure                               Abnormality         Status                     ---------                               -----------         ------                     Green Top (Gel)[[id number]]                                  Final result                Lavender Top[[id number]]                                     Final result               Gold Top - SST[[id number]]                                   Final result               Oliveira Top[[id number]]                                         In process                 Light Blue Top[[id number]]                                   Final result                 Please view results for these tests on the individual orders.    Green Top (Gel) [[id number]] Collected: 03/28/24 0723    Specimen: Blood Updated: 03/28/24 0831     Extra Tube Hold for add-ons.     Comment: Auto resulted.       Lavender Top [[id number]] Collected: 03/28/24 0723    Specimen: Blood Updated: 03/28/24 0831     Extra Tube hold for add-on     Comment: Auto resulted       Gold Top - SST [[id number]] Collected: 03/28/24 0723    Specimen: Blood Updated: 03/28/24 0831     Extra Tube Hold for add-ons.     Comment: Auto resulted.       Light Blue Top [[id number]] Collected: 03/28/24 0723    Specimen: Blood Updated: 03/28/24 0831     Extra Tube Hold for add-ons.     Comment: Auto resulted       CBC & Differential [[id number]]  (Abnormal) Collected: 03/28/24 0723    Specimen: Blood Updated: 03/28/24 0818    Narrative:      The following orders were created for panel order CBC & Differential.  Procedure                               Abnormality         Status                     ---------                               -----------         ------                     CBC Auto Differential[[id number]]        Abnormal            Final result               Scan Slide[[id number]]                                                                    Please view results for these tests on the individual orders.    CBC Auto Differential [[id number]]  (Abnormal) Collected: 03/28/24 0723    Specimen: Blood Updated: 03/28/24 0818     WBC 4.54 10*3/mm3      RBC 4.44 10*6/mm3      Hemoglobin 9.7 g/dL      Hematocrit 32.9 %      MCV 74.1 fL      MCH 21.8 pg      MCHC 29.5 g/dL      RDW 18.0 %       RDW-SD 47.8 fl      MPV 9.4 fL      Platelets 361 10*3/mm3      Neutrophil % 62.4 %      Lymphocyte % 29.3 %      Monocyte % 5.7 %      Eosinophil % 2.0 %      Basophil % 0.4 %      Immature Grans % 0.2 %      Neutrophils, Absolute 2.83 10*3/mm3      Lymphocytes, Absolute 1.33 10*3/mm3      Monocytes, Absolute 0.26 10*3/mm3      Eosinophils, Absolute 0.09 10*3/mm3      Basophils, Absolute 0.02 10*3/mm3      Immature Grans, Absolute 0.01 10*3/mm3      nRBC 0.0 /100 WBC     Comprehensive Metabolic Panel [[id number]] Collected: 03/28/24 0723    Specimen: Blood Updated: 03/28/24 0814     Glucose 87 mg/dL      BUN 11 mg/dL      Creatinine 0.70 mg/dL      Sodium 141 mmol/L      Potassium 3.7 mmol/L      Chloride 105 mmol/L      CO2 26.0 mmol/L      Calcium 8.7 mg/dL      Total Protein 7.2 g/dL      Albumin 4.4 g/dL      ALT (SGPT) 7 U/L      AST (SGOT) 14 U/L      Alkaline Phosphatase 50 U/L      Total Bilirubin 0.6 mg/dL      Globulin 2.8 gm/dL      Comment: Calculated Result        A/G Ratio 1.6 g/dL      BUN/Creatinine Ratio 15.7     Anion Gap 10.0 mmol/L      eGFR 108.2 mL/min/1.73     Narrative:      GFR Normal >60  Chronic Kidney Disease <60  Kidney Failure <15      Lipase [[id number]]  (Normal) Collected: 03/28/24 0723    Specimen: Blood Updated: 03/28/24 0814     Lipase 35 U/L     Urinalysis With Microscopic If Indicated (No Culture) - Urine, Clean Catch [[id number]]  (Abnormal) Collected: 03/28/24 0723    Specimen: Urine, Clean Catch Updated: 03/28/24 0747     Color, UA Yellow     Appearance, UA Clear     pH, UA 6.0     Specific Gravity, UA 1.020     Glucose, UA Negative     Ketones, UA 15 mg/dL (1+)     Bilirubin, UA Negative     Blood, UA Large (3+)     Protein, UA Trace     Leuk Esterase, UA Negative     Nitrite, UA Negative     Urobilinogen, UA 0.2 E.U./dL    Urinalysis, Microscopic Only - Urine, Clean Catch [[id number]]  (Abnormal) Collected: 03/28/24 0723    Specimen: Urine, Clean Catch Updated: 03/28/24  0747     RBC, UA Too Numerous to Count /HPF      WBC, UA 0-2 /HPF      Bacteria, UA None Seen /HPF      Squamous Epithelial Cells, UA 7-12 /HPF      Hyaline Casts, UA 0-6 /LPF      Methodology Automated Microscopy    POC Urine Pregnancy [[id number]]  (Normal) Collected: 03/28/24 0737    Specimen: Urine Updated: 03/28/24 0739     HCG, Urine, QL Negative     Lot Number [id number]     Internal Positive Control Passed     Internal Negative Control Passed     Expiration Date 01-    Gray Top [[id number]] Collected: 03/28/24 0723    Specimen: Blood Updated: 03/28/24 0732           Imaging Results (Last 24 Hours)       Procedure Component Value Units Date/Time    CT Abdomen Pelvis Without Contrast [[id number]] Collected: 03/28/24 0802     Updated: 03/28/24 0814    Narrative:      CT ABDOMEN PELVIS WO CONTRAST    Date of Exam: 3/28/2024 7:50 AM EDT    Indication: left flank pain.    Comparison: None available.    Technique: Axial CT images were obtained of the abdomen and pelvis without the administration of contrast. Reconstructed coronal and sagittal images were also obtained. Automated exposure control and iterative construction methods were used.    Findings:    Lower Thorax: No focal consolidation.     Liver: Scattered small low attenuating lesions, some of which are cysts, however others are difficult to characterize due to small size and noncontrast technique; in the absence of malignancy, these likely represent cysts and/or hemangiomas. Normal   morphology of the liver. No evidence of steatosis.    Gallbladder and bile ducts: Gallbladder is unremarkable. No biliary ductal dilatation.     Pancreas: No pancreatic duct dilation. No surrounding inflammation.     Spleen: Normal in size.     Adrenal glands: No discrete adrenal nodule.     Kidneys, ureters, and urinary bladder: 5 mm stone at the left ureterovesicular junction with mild upstream left-sided hydroureteronephrosis. Three 1 mm nonobstructing stones in the left  kidney. Single 1 mm nonobstructing stone in the right kidney. No   right hydronephrosis. Urinary bladder is partially decompressed.    Reproductive Organs: Unremarkable.    Stomach and Bowel: No evidence of bowel obstruction. Normal appendix.    Lymph nodes: No pathologically enlarged lymph nodes.     Vessels: No abdominal aortic aneurysm.     Peritoneum and retroperitoneum: No free air or free fluid.     Soft tissues: Small fat-containing umbilical hernia.    Osseous structures: No acute or suspicious osseous lesions. L5-S1 degenerative disc disease.      Impression:      Impression:    5 mm obstructing stone at the left ureterovesicular junction with mild upstream hydroureteronephrosis.    Additional punctate nonobstructing bilateral renal stones.    Nonemergent findings as above.      Electronically Signed: Trung Gabriel MD    3/28/2024 8:11 AM EDT    Workstation ID: [id number]            Labs  Urine Microscopy:   Results from last 7 days   Lab Units 03/28/24  0723   RBC UA /HPF Too Numerous to Count*   WBC UA /HPF 0-2   , Urinalysis:   Results from last 7 days   Lab Units 03/28/24  0723   PH, URINE  6.0   PROTEIN UA  Trace*   GLUCOSE UA  Negative   KETONES UA  15 mg/dL (1+)*   , Urine Culture:   , BMP:   Results from last 7 days   Lab Units 03/28/24  0723   SODIUM mmol/L 141   POTASSIUM mmol/L 3.7   CALCIUM mg/dL 8.7   CHLORIDE mmol/L 105   CO2 mmol/L 26.0   BUN mg/dL 11   CREATININE mg/dL 0.70   ALBUMIN g/dL 4.4   BILIRUBIN mg/dL 0.6   ALK PHOS U/L 50   , and CBC:   Results from last 7 days   Lab Units 03/28/24  0723   WBC 10*3/mm3 4.54   RBC 10*6/mm3 4.44   HEMOGLOBIN g/dL 9.7*   HEMATOCRIT % 32.9*   PLATELETS 10*3/mm3 361       Imaging  CT Abdomen: Reviewed, as per HPI      Assessment   48 y.o. female with  left distal ureteral stone with significant renal colic, admitted to the hospital.      Plan   We reviewed options of management including medical expulsive therapy versus ureteroscopic stone extraction and  stent placement.  She wishes to proceed with ureteroscopic stone extraction with possible laser lithotripsy and stent placement.  Risks and benefits reviewed.      I discussed the patients findings and my recommendations with patient and family    Harsha Mclean MD  03/28/24  11:23 EDT

## 2024-03-28 NOTE — ANESTHESIA POSTPROCEDURE EVALUATION
Patient: Judith Sandoval    Procedure Summary       Date: 03/28/24 Room / Location:  [facility]    Anesthesia Start: 1851 Anesthesia Stop: 1926    Procedure: CYSTOSCOPY LEFT URETEROSCOPY AND STENT INSERTION (Left) Diagnosis:     Surgeons: Hasrha Mclean MD Provider: Niraj Lynne MD    Anesthesia Type: general ASA Status: 2            Anesthesia Type: general    Vitals  Vitals Value Taken Time   BP     Temp     Pulse 65 03/28/24 1925   Resp     SpO2 100 % 03/28/24 1925   Vitals shown include unfiled device data.        Post Anesthesia Care and Evaluation    Patient location during evaluation: PACU  Patient participation: complete - patient participated  Level of consciousness: awake and alert  Pain management: adequate    Airway patency: patent  Anesthetic complications: No anesthetic complications  PONV Status: none  Cardiovascular status: hemodynamically stable and acceptable  Respiratory status: nonlabored ventilation, acceptable and nasal cannula  Hydration status: acceptable

## 2024-03-28 NOTE — ANESTHESIA PROCEDURE NOTES
Airway  Urgency: elective    Date/Time: 3/28/2024 6:58 PM  Airway not difficult    General Information and Staff    Patient location during procedure: OR  Anesthesiologist: Niraj Lynne MD    Indications and Patient Condition  Indications for airway management: airway protection    Preoxygenated: yes  Mask difficulty assessment: 1 - vent by mask    Final Airway Details  Final airway type: supraglottic airway      Successful airway: I-gel  Size 4     Number of attempts at approach: 1  Assessment: lips, teeth, and gum same as pre-op    Additional Comments  LMA placed without difficulty, ventilation with assist, equal breath sounds and symmetric chest rise and fall

## 2024-03-28 NOTE — BRIEF OP NOTE
CYSTOSCOPY URETEROSCOPY  Progress Note    Judith Sandoval  3/28/2024    Pre-op Diagnosis:   Left ureteral stone       Post-Op Diagnosis Codes:  Left ureteral stone    Procedure/CPT® Codes:  Cystoscopy, left ureteroscopy, left ureteral stent placement, intraoperative fluoroscopy with interpretation-less than 1 hour    Surgeon(s):  Harsha Mclean MD    Anesthesia: General    Staff:   Circulator: Lefty Mcmillan RN; Nancy Saavedra RN  Laser Staff: Cece Huber RN  Scrub Person: Sandra Howell         Estimated Blood Loss: none    Urine Voided: * No values recorded between 3/28/2024  6:53 PM and 3/28/2024  7:20 PM *    Specimens:                None      Drains: * No LDAs found *    Findings: No evidence of left ureteral stone by semirigid ureteroscopy    Complications: None noted          Harsha Mclean MD     Date: 3/28/2024  Time: 19:21 EDT

## 2024-03-28 NOTE — ED PROVIDER NOTES
Subjective   History of Present Illness  Ms. Sandoval is a 48-year-old female with history of prior kidney stone and migraine headaches, who presents to the emergency department with complaints of severe left flank pain that began last night.  She has associated nausea and vomiting.  No significant abdominal pain.  She notes some mild dysuria and some pink-tinged urine.  Normal bowel movements.  No fever or chills.  She denies any trauma.      Review of Systems   Constitutional:  Negative for chills and fever.   HENT:  Negative for sore throat.    Respiratory:  Negative for cough and shortness of breath.    Cardiovascular:  Negative for chest pain.   Gastrointestinal:  Positive for nausea and vomiting. Negative for abdominal pain.   Genitourinary:  Positive for dysuria, flank pain (Left flank pain) and hematuria.   Musculoskeletal:  Positive for back pain (left lower).   Skin:  Negative for pallor.   Hematological:  Does not bruise/bleed easily.   Psychiatric/Behavioral: Negative.         Past Medical History:   Diagnosis Date    Bartholin's duct cyst 2014    HPV (human papilloma virus) infection 2014    Kidney stone     Migraine        Allergies   Allergen Reactions    Cefaclor Unknown (See Comments)    Chocolate Unknown (See Comments)    Erythromycin Unknown (See Comments)    Penicillins Unknown (See Comments)    Sulfamethoxazole-Trimethoprim Unknown (See Comments)    Tree Nut Unknown (See Comments)       Past Surgical History:   Procedure Laterality Date    BARTHOLIN CYST MARSUPIALIZATION Left 2017     SECTION      X 4    DILATATION AND CURETTAGE      TUBAL ABDOMINAL LIGATION      TUMOR EXCISION  2011    BACK       Family History   Adopted: Yes       Social History     Socioeconomic History    Marital status:    Tobacco Use    Smoking status: Former     Current packs/day: 0.00     Types: Cigarettes     Quit date: 2016     Years since quittin.4    Smokeless tobacco: Never    Tobacco  "comments:     smokes e cigarette   Vaping Use    Vaping status: Every Day    Substances: Nicotine    Devices: Disposable   Substance and Sexual Activity    Alcohol use: Yes     Alcohol/week: 2.0 standard drinks of alcohol     Types: 2 Drinks containing 0.5 oz of alcohol per week     Comment: SOCIAL    Drug use: Yes     Types: Marijuana     Comment: MONTHLY     Sexual activity: Yes     Partners: Male           Objective   Physical Exam  Constitutional:       Appearance: Normal appearance.      Comments: Appears in considerable pain, grimacing, standing, bent over with hands on knees.   HENT:      Nose: Nose normal.      Mouth/Throat:      Mouth: Mucous membranes are moist.   Eyes:      Pupils: Pupils are equal, round, and reactive to light.   Cardiovascular:      Rate and Rhythm: Normal rate and regular rhythm.      Pulses: Normal pulses.      Heart sounds: Normal heart sounds.   Pulmonary:      Effort: Pulmonary effort is normal.      Breath sounds: Normal breath sounds.   Abdominal:      Tenderness: There is no abdominal tenderness.   Skin:     General: Skin is warm and dry.   Neurological:      Mental Status: She is alert and oriented to person, place, and time.   Psychiatric:         Mood and Affect: Mood normal.         Procedures           ED Course      In summary, 48-year-old female with prior history of kidney stones and migraine headaches, presents with acute onset left flank pain, nausea and vomiting.  The symptoms began last night.  She has some mild dysuria and some \"pink-tinged urine\".  No fever or chills.    MDM: Differential includes kidney stone, UTI, colitis, etc.    Labs and UA obtained.  Patient sent for CT abdomen pelvis without contrast.    Patient given Toradol 15 mg IV and a liter of saline over an hour.  Patient also given Dilaudid 0.25 mg IV with no relief of symptoms.  Patient given an additional dose of Dilaudid 0.25 mg IV and then 0.5 mg IV, again with no relief.  Will add a dose of " Pyridium and if pain is not relieved, will consider starting lidocaine IV.    Urinalysis shows too numerous to count red cells, 0-2 white cells, no bacteria.  Urine pregnancy test is negative.  White count is normal at 4.54.  She has a chronic anemia with an H&H of 9.7/32.9.    Creatinine is normal at 0.70.  No concerning chemistries.  Glucose is 87.    CT abdomen pelvis:  5 mm obstructing stone at the left ureterovesicular junction with mild upstream hydroureteronephrosis.     Additional punctate nonobstructing bilateral renal stones.    09:16 EDT  Patient is still quite uncomfortable.  She states that she cannot tell that any of the pain meds have helped.  I spoke with Dr. Harsha Mclean (urology) who advised that he could follow-up with her in office today if we can get her comfortable, otherwise admit her to the hospitalist and add him to the consult.    09:16 EDT  She is still in pain after multiple doses of Dilaudid and a dose of Toradol.  Will start on lidocaine infusion over 15min and speak with hospitalist for admission.                                               Medical Decision Making  Problems Addressed:  Intractable pain: complicated acute illness or injury  Renal colic on left side: complicated acute illness or injury  Ureterolithiasis: complicated acute illness or injury    Amount and/or Complexity of Data Reviewed  Labs: ordered.  Radiology: ordered.    Risk  Prescription drug management.  Decision regarding hospitalization.        Final diagnoses:   Renal colic on left side   Ureterolithiasis   Intractable pain       ED Disposition  ED Disposition       ED Disposition   Decision to Admit    Condition   --    Comment   --               No follow-up provider specified.       Medication List      No changes were made to your prescriptions during this visit.            Misael Escobar PA  03/28/24 0908       Misael Escobar PA  03/28/24 0916

## 2024-03-28 NOTE — PLAN OF CARE
Problem: Adult Inpatient Plan of Care  Goal: Plan of Care Review  Outcome: Ongoing, Not Progressing  Goal: Patient-Specific Goal (Individualized)  Outcome: Ongoing, Not Progressing  Goal: Absence of Hospital-Acquired Illness or Injury  Outcome: Ongoing, Not Progressing  Intervention: Identify and Manage Fall Risk  Recent Flowsheet Documentation  Taken 3/28/2024 1400 by Zenon Urrutia RN  Safety Promotion/Fall Prevention:   safety round/check completed   room organization consistent   nonskid shoes/slippers when out of bed   lighting adjusted   fall prevention program maintained   clutter free environment maintained   assistive device/personal items within reach   activity supervised  Taken 3/28/2024 1244 by Zenon Urrutia RN  Safety Promotion/Fall Prevention:   safety round/check completed   room organization consistent   nonskid shoes/slippers when out of bed   lighting adjusted   fall prevention program maintained   clutter free environment maintained   activity supervised   assistive device/personal items within reach  Intervention: Prevent Skin Injury  Recent Flowsheet Documentation  Taken 3/28/2024 1400 by Zenon Urrutia RN  Body Position: position changed independently  Skin Protection:   adhesive use limited   transparent dressing maintained   tubing/devices free from skin contact  Taken 3/28/2024 1244 by Zenon Urrutia RN  Body Position: position changed independently  Skin Protection:   adhesive use limited   tubing/devices free from skin contact   transparent dressing maintained  Intervention: Prevent and Manage VTE (Venous Thromboembolism) Risk  Recent Flowsheet Documentation  Taken 3/28/2024 1400 by Zenon Urrutia RN  Activity Management: activity encouraged  Taken 3/28/2024 1244 by Zenon Urrutia RN  Activity Management: activity encouraged  VTE Prevention/Management:   bilateral   sequential compression devices off  Range of Motion: active ROM (range of motion) encouraged  Goal: Optimal  Comfort and Wellbeing  Outcome: Ongoing, Not Progressing  Intervention: Provide Person-Centered Care  Recent Flowsheet Documentation  Taken 3/28/2024 1400 by Zenon Urrutia RN  Trust Relationship/Rapport: care explained  Taken 3/28/2024 1244 by Zenon Urrutia RN  Trust Relationship/Rapport: care explained  Goal: Readiness for Transition of Care  Outcome: Ongoing, Not Progressing  Intervention: Mutually Develop Transition Plan  Recent Flowsheet Documentation  Taken 3/28/2024 1239 by Zenon Urrutia, RN  Equipment Currently Used at Home: none     Problem: Pain Acute  Goal: Acceptable Pain Control and Functional Ability  Outcome: Ongoing, Not Progressing  Intervention: Prevent or Manage Pain  Recent Flowsheet Documentation  Taken 3/28/2024 1400 by Zenon Urrutia, RN  Medication Review/Management: medications reviewed  Taken 3/28/2024 1244 by Zenon Urrutia, RN  Medication Review/Management: medications reviewed   Goal Outcome Evaluation:

## 2024-03-28 NOTE — OP NOTE
CYSTOSCOPY URETEROSCOPY  Progress Note    Judith Sandoval  3/28/2024    Pre-op Diagnosis:   Left ureteral stone       Post-Op Diagnosis Codes:  Left ureteral stone    Procedure/CPT® Codes:  Cystoscopy, left ureteroscopy, left ureteral stent placement, intraoperative fluoroscopy with interpretation-less than 1 hour    Surgeon(s):  Harsha Mclean MD    Anesthesia: General    Staff:   Circulator: Lefty Mcmillan RN; Nancy Saavedra RN  Laser Staff: Cece Huber RN  Scrub Person: Sandra Howell     Estimated Blood Loss: none    Urine Voided: * No values recorded between 3/28/2024  6:53 PM and 3/28/2024  7:20 PM *    Specimens:                None      Drains: * No LDAs found *    Findings: No evidence of left ureteral stone by semirigid ureteroscopy    Complications: None noted    Procedure: Patient was correctly identified in the preoperative holding area.  Informed consent is obtained.  She is taken to the operative suite positioned supine position.  Anesthesia induced.  Repositioned to lithotomy position.  All pressure points padded.  Proper timeout procedure completed.  Preoperative antibiotics to be given.  The genitourinary area is prepped and draped in normal sterile fashion.  Rigid cystoscopy performed.  The left ureteral orifice was identified and cannulated with zip wire.  The wire was advanced to the renal pelvis utilizing fluoroscopy.  The cystoscope was removed leaving the wire in place.  Semirigid ureteroscopy performed alongside the wire to the proximal ureter.  No stone visualized with placement or removal of the ureteroscope.  4.8 [place] X 24 cm ureteral stent placed over the wire in retrograde fashion using fluoroscopic and cystoscopic guidance.  Good coil seen proximally distally.  Patient's bladder was drained.  Lidocaine gel instilled for local analgesia.  The string of the stent was secured to the patient's inner thigh.  She is taken to the recovery in stable  condition.    Disposition: She is taken to recovery in stable condition.  She has continued inpatient care and can be discharged per hospitalist service.  Ureteral stent should be removed by pulling the string in 4 to 5 days.  Stent can be removed by the patient or she can follow-up in the urology office.      Harsha Mclean MD     Date: 3/28/2024  Time: 19:23 EDT

## 2024-03-28 NOTE — CASE MANAGEMENT/SOCIAL WORK
Discharge Planning Assessment  [facility]     Patient Name: Judith Sandoval  MRN: [id number]  Today's Date: 3/28/2024    Admit Date: 3/28/2024    Plan: Initial   Discharge Needs Assessment       Row Name 03/28/24 1019       Living Environment    People in Home significant other    Name(s) of People in Home JANKI BURCH Significant Other [phone]    Current Living Arrangements home    Potentially Unsafe Housing Conditions none    In the past 12 months has the electric, gas, oil, or water company threatened to shut off services in your home? No    Primary Care Provided by self    Provides Primary Care For no one    Family Caregiver if Needed significant other    Family Caregiver Names JANKI BURCH Significant Other [phone]    Quality of Family Relationships helpful;involved;supportive    Able to Return to Prior Arrangements yes       Resource/Environmental Concerns    Resource/Environmental Concerns none    Transportation Concerns none       Transportation Needs    In the past 12 months, has lack of transportation kept you from medical appointments or from getting medications? no    In the past 12 months, has lack of transportation kept you from meetings, work, or from getting things needed for daily living? No       Food Insecurity    Within the past 12 months, you worried that your food would run out before you got the money to buy more. Never true    Within the past 12 months, the food you bought just didn't last and you didn't have money to get more. Never true       Transition Planning    Patient/Family Anticipates Transition to home    Patient/Family Anticipated Services at Transition none    Transportation Anticipated family or friend will provide       Discharge Needs Assessment    Readmission Within the Last 30 Days no previous admission in last 30 days    Equipment Currently Used at Home none    Concerns to be Addressed denies needs/concerns at this time    Anticipated Changes Related to  Illness none    Equipment Needed After Discharge none                   Discharge Plan       Row Name 03/28/24 1020       Plan    Plan Initial    Plan Comments CM spoke with patient at bedside regarding DC planning. Patient resides in [facility] with her SO. Patient is independent with ADL's, denies any DME. Patient denies any current home health or outpatient services. Patient has medical insurance, prescription coverage and is able to afford/obtain medications without difficulty. Patient has no advanced directives. Patient denies any discharge planning needs. Plan is home. CM will continue to follow    Final Discharge Disposition Code 01 - home or self-care                  Continued Care and Services - Admitted Since 3/28/2024    No active coordination exists for this encounter.          Demographic Summary       Row Name 03/28/24 1018       General Information    Arrived From home    Referral Source emergency department    Reason for Consult discharge planning    Preferred Language English       Contact Information    Contact Information Comments JANKI BURCH Significant Other [phone]                   Functional Status       Row Name 03/28/24 1018       Functional Status    Usual Activity Tolerance good    Current Activity Tolerance good       Physical Activity    On average, how many days per week do you engage in moderate to strenuous exercise (like a brisk walk)? Pt Declined    On average, how many minutes do you engage in exercise at this level? Pt Declined       Assessment of Health Literacy    How often do you have someone help you read hospital materials? Never    How often do you have problems learning about your medical condition because of difficulty understanding written information? Never    How often do you have a problem understanding what is told to you about your medical condition? Never    How confident are you filling out medical forms by yourself? Quite a bit    Health Literacy Good        Functional Status, IADL    Medications independent    Meal Preparation independent    Housekeeping independent    Laundry independent    Shopping independent       Mental Status    General Appearance WDL WDL       Mental Status Summary    Recent Changes in Mental Status/Cognitive Functioning no changes       Employment/    Employment Status self-employed                   Psychosocial    No documentation.                  Abuse/Neglect    No documentation.                  Legal    No documentation.                  Substance Abuse    No documentation.                  Patient Forms    No documentation.                     Amy Ramos RN

## 2024-03-29 ENCOUNTER — READMISSION MANAGEMENT (OUTPATIENT)
Dept: CALL CENTER | Facility: HOSPITAL | Age: 49
End: 2024-03-29
Payer: COMMERCIAL

## 2024-03-29 ENCOUNTER — APPOINTMENT (OUTPATIENT)
Dept: CT IMAGING | Facility: HOSPITAL | Age: 49
End: 2024-03-29
Payer: COMMERCIAL

## 2024-03-29 ENCOUNTER — NURSE TRIAGE (OUTPATIENT)
Dept: CALL CENTER | Facility: HOSPITAL | Age: 49
End: 2024-03-29
Payer: COMMERCIAL

## 2024-03-29 ENCOUNTER — HOSPITAL ENCOUNTER (EMERGENCY)
Facility: HOSPITAL | Age: 49
Discharge: HOME | End: 2024-03-29
Attending: EMERGENCY MEDICINE
Payer: COMMERCIAL

## 2024-03-29 VITALS
SYSTOLIC BLOOD PRESSURE: 105 MMHG | BODY MASS INDEX: 21.16 KG/M2 | HEART RATE: 65 BPM | TEMPERATURE: 98.3 F | HEIGHT: 65 IN | WEIGHT: 127 LBS | DIASTOLIC BLOOD PRESSURE: 68 MMHG | OXYGEN SATURATION: 99 % | RESPIRATION RATE: 18 BRPM

## 2024-03-29 VITALS
DIASTOLIC BLOOD PRESSURE: 65 MMHG | WEIGHT: 127 LBS | OXYGEN SATURATION: 100 % | SYSTOLIC BLOOD PRESSURE: 106 MMHG | HEART RATE: 70 BPM | BODY MASS INDEX: 21.16 KG/M2 | TEMPERATURE: 97.8 F | RESPIRATION RATE: 14 BRPM | HEIGHT: 65 IN

## 2024-03-29 DIAGNOSIS — R10.9 ACUTE ABDOMINAL PAIN: ICD-10-CM

## 2024-03-29 DIAGNOSIS — R82.81 PYURIA: Primary | ICD-10-CM

## 2024-03-29 LAB
ALBUMIN SERPL-MCNC: 3.7 G/DL (ref 3.5–5.2)
ALBUMIN/GLOB SERPL: 1.5 G/DL
ALP SERPL-CCNC: 45 U/L (ref 39–117)
ALT SERPL W P-5'-P-CCNC: 7 U/L (ref 1–33)
ANION GAP SERPL CALCULATED.3IONS-SCNC: 10 MMOL/L (ref 5–15)
ANION GAP SERPL CALCULATED.3IONS-SCNC: 7 MMOL/L (ref 5–15)
AST SERPL-CCNC: 15 U/L (ref 1–32)
BACTERIA UR QL AUTO: ABNORMAL /HPF
BASOPHILS # BLD AUTO: 0.01 10*3/MM3 (ref 0–0.2)
BASOPHILS NFR BLD AUTO: 0.1 % (ref 0–1.5)
BILIRUB SERPL-MCNC: 0.2 MG/DL (ref 0–1.2)
BILIRUB UR QL STRIP: ABNORMAL
BUN SERPL-MCNC: 11 MG/DL (ref 6–20)
BUN SERPL-MCNC: 14 MG/DL (ref 6–20)
BUN/CREAT SERPL: 16.9 (ref 7–25)
BUN/CREAT SERPL: 25.5 (ref 7–25)
CALCIUM SPEC-SCNC: 8.4 MG/DL (ref 8.6–10.5)
CALCIUM SPEC-SCNC: 8.7 MG/DL (ref 8.6–10.5)
CHLORIDE SERPL-SCNC: 106 MMOL/L (ref 98–107)
CHLORIDE SERPL-SCNC: 108 MMOL/L (ref 98–107)
CLARITY UR: ABNORMAL
CO2 SERPL-SCNC: 23 MMOL/L (ref 22–29)
CO2 SERPL-SCNC: 25 MMOL/L (ref 22–29)
COLOR UR: ABNORMAL
CREAT SERPL-MCNC: 0.55 MG/DL (ref 0.57–1)
CREAT SERPL-MCNC: 0.65 MG/DL (ref 0.57–1)
DEPRECATED RDW RBC AUTO: 45.1 FL (ref 37–54)
DEPRECATED RDW RBC AUTO: 46.2 FL (ref 37–54)
EGFRCR SERPLBLD CKD-EPI 2021: 110.1 ML/MIN/1.73
EGFRCR SERPLBLD CKD-EPI 2021: 114.6 ML/MIN/1.73
EOSINOPHIL # BLD AUTO: 0.02 10*3/MM3 (ref 0–0.4)
EOSINOPHIL NFR BLD AUTO: 0.2 % (ref 0.3–6.2)
ERYTHROCYTE [DISTWIDTH] IN BLOOD BY AUTOMATED COUNT: 17.3 % (ref 12.3–15.4)
ERYTHROCYTE [DISTWIDTH] IN BLOOD BY AUTOMATED COUNT: 17.5 % (ref 12.3–15.4)
GLOBULIN UR ELPH-MCNC: 2.4 GM/DL
GLUCOSE SERPL-MCNC: 143 MG/DL (ref 65–99)
GLUCOSE SERPL-MCNC: 95 MG/DL (ref 65–99)
GLUCOSE UR STRIP-MCNC: NEGATIVE MG/DL
HCT VFR BLD AUTO: 28.6 % (ref 34–46.6)
HCT VFR BLD AUTO: 30.1 % (ref 34–46.6)
HGB BLD-MCNC: 8.6 G/DL (ref 12–15.9)
HGB BLD-MCNC: 9 G/DL (ref 12–15.9)
HGB UR QL STRIP.AUTO: ABNORMAL
HYALINE CASTS UR QL AUTO: ABNORMAL /LPF
IMM GRANULOCYTES # BLD AUTO: 0.05 10*3/MM3 (ref 0–0.05)
IMM GRANULOCYTES NFR BLD AUTO: 0.4 % (ref 0–0.5)
KETONES UR QL STRIP: NEGATIVE
LEUKOCYTE ESTERASE UR QL STRIP.AUTO: ABNORMAL
LIPASE SERPL-CCNC: 47 U/L (ref 13–60)
LYMPHOCYTES # BLD AUTO: 1.25 10*3/MM3 (ref 0.7–3.1)
LYMPHOCYTES NFR BLD AUTO: 10.7 % (ref 19.6–45.3)
MCH RBC QN AUTO: 21.7 PG (ref 26.6–33)
MCH RBC QN AUTO: 21.7 PG (ref 26.6–33)
MCHC RBC AUTO-ENTMCNC: 29.9 G/DL (ref 31.5–35.7)
MCHC RBC AUTO-ENTMCNC: 30.1 G/DL (ref 31.5–35.7)
MCV RBC AUTO: 72 FL (ref 79–97)
MCV RBC AUTO: 72.7 FL (ref 79–97)
MONOCYTES # BLD AUTO: 0.58 10*3/MM3 (ref 0.1–0.9)
MONOCYTES NFR BLD AUTO: 4.9 % (ref 5–12)
NEUTROPHILS NFR BLD AUTO: 83.7 % (ref 42.7–76)
NEUTROPHILS NFR BLD AUTO: 9.82 10*3/MM3 (ref 1.7–7)
NITRITE UR QL STRIP: POSITIVE
NRBC BLD AUTO-RTO: 0 /100 WBC (ref 0–0.2)
PH UR STRIP.AUTO: <=5 [PH] (ref 5–8)
PLATELET # BLD AUTO: 297 10*3/MM3 (ref 140–450)
PLATELET # BLD AUTO: 344 10*3/MM3 (ref 140–450)
PMV BLD AUTO: 10.1 FL (ref 6–12)
PMV BLD AUTO: 9.8 FL (ref 6–12)
POTASSIUM SERPL-SCNC: 4.1 MMOL/L (ref 3.5–5.2)
POTASSIUM SERPL-SCNC: 4.2 MMOL/L (ref 3.5–5.2)
PROT SERPL-MCNC: 6.1 G/DL (ref 6–8.5)
PROT UR QL STRIP: ABNORMAL
RBC # BLD AUTO: 3.97 10*6/MM3 (ref 3.77–5.28)
RBC # BLD AUTO: 4.14 10*6/MM3 (ref 3.77–5.28)
RBC # UR STRIP: ABNORMAL /HPF
REF LAB TEST METHOD: ABNORMAL
SODIUM SERPL-SCNC: 139 MMOL/L (ref 136–145)
SODIUM SERPL-SCNC: 140 MMOL/L (ref 136–145)
SP GR UR STRIP: 1.02 (ref 1–1.03)
SQUAMOUS #/AREA URNS HPF: ABNORMAL /HPF
UROBILINOGEN UR QL STRIP: ABNORMAL
WBC # UR STRIP: ABNORMAL /HPF
WBC NRBC COR # BLD AUTO: 11.73 10*3/MM3 (ref 3.4–10.8)
WBC NRBC COR # BLD AUTO: 5.09 10*3/MM3 (ref 3.4–10.8)

## 2024-03-29 PROCEDURE — 85025 COMPLETE CBC W/AUTO DIFF WBC: CPT | Performed by: EMERGENCY MEDICINE

## 2024-03-29 PROCEDURE — 85027 COMPLETE CBC AUTOMATED: CPT | Performed by: UROLOGY

## 2024-03-29 PROCEDURE — 25010000002 ONDANSETRON PER 1 MG: Performed by: EMERGENCY MEDICINE

## 2024-03-29 PROCEDURE — 83690 ASSAY OF LIPASE: CPT | Performed by: EMERGENCY MEDICINE

## 2024-03-29 PROCEDURE — 74176 CT ABD & PELVIS W/O CONTRAST: CPT

## 2024-03-29 PROCEDURE — 25810000003 SODIUM CHLORIDE 0.9 % SOLUTION: Performed by: EMERGENCY MEDICINE

## 2024-03-29 PROCEDURE — 96375 TX/PRO/DX INJ NEW DRUG ADDON: CPT

## 2024-03-29 PROCEDURE — 80053 COMPREHEN METABOLIC PANEL: CPT | Performed by: UROLOGY

## 2024-03-29 PROCEDURE — 25010000002 KETOROLAC TROMETHAMINE PER 15 MG: Performed by: EMERGENCY MEDICINE

## 2024-03-29 PROCEDURE — 81001 URINALYSIS AUTO W/SCOPE: CPT | Performed by: EMERGENCY MEDICINE

## 2024-03-29 PROCEDURE — 25010000002 MORPHINE PER 10 MG: Performed by: EMERGENCY MEDICINE

## 2024-03-29 PROCEDURE — 96374 THER/PROPH/DIAG INJ IV PUSH: CPT

## 2024-03-29 PROCEDURE — G0378 HOSPITAL OBSERVATION PER HR: HCPCS

## 2024-03-29 PROCEDURE — 99284 EMERGENCY DEPT VISIT MOD MDM: CPT

## 2024-03-29 RX ORDER — KETOROLAC TROMETHAMINE 15 MG/ML
15 INJECTION, SOLUTION INTRAMUSCULAR; INTRAVENOUS ONCE
Status: COMPLETED | OUTPATIENT
Start: 2024-03-29 | End: 2024-03-29

## 2024-03-29 RX ORDER — FLUCONAZOLE 150 MG/1
150 TABLET ORAL ONCE
Qty: 1 TABLET | Refills: 0 | Status: SHIPPED | OUTPATIENT
Start: 2024-03-29 | End: 2024-03-29

## 2024-03-29 RX ORDER — MORPHINE SULFATE 4 MG/ML
4 INJECTION, SOLUTION INTRAMUSCULAR; INTRAVENOUS ONCE
Status: COMPLETED | OUTPATIENT
Start: 2024-03-29 | End: 2024-03-29

## 2024-03-29 RX ORDER — CEFUROXIME AXETIL 250 MG/1
250 TABLET ORAL 2 TIMES DAILY
Qty: 6 TABLET | Refills: 0 | Status: SHIPPED | OUTPATIENT
Start: 2024-03-29 | End: 2024-04-01

## 2024-03-29 RX ORDER — OXYCODONE HYDROCHLORIDE AND ACETAMINOPHEN 5; 325 MG/1; MG/1
1 TABLET ORAL EVERY 6 HOURS PRN
Qty: 12 TABLET | Refills: 0 | Status: SHIPPED | OUTPATIENT
Start: 2024-03-29 | End: 2024-04-04

## 2024-03-29 RX ORDER — SODIUM CHLORIDE 0.9 % (FLUSH) 0.9 %
10 SYRINGE (ML) INJECTION AS NEEDED
Status: DISCONTINUED | OUTPATIENT
Start: 2024-03-29 | End: 2024-03-29 | Stop reason: HOSPADM

## 2024-03-29 RX ORDER — ONDANSETRON 2 MG/ML
4 INJECTION INTRAMUSCULAR; INTRAVENOUS ONCE
Status: COMPLETED | OUTPATIENT
Start: 2024-03-29 | End: 2024-03-29

## 2024-03-29 RX ADMIN — KETOROLAC TROMETHAMINE 15 MG: 15 INJECTION, SOLUTION INTRAMUSCULAR; INTRAVENOUS at 12:34

## 2024-03-29 RX ADMIN — SODIUM CHLORIDE 1000 ML: 9 INJECTION, SOLUTION INTRAVENOUS at 12:30

## 2024-03-29 RX ADMIN — MORPHINE SULFATE 4 MG: 4 INJECTION, SOLUTION INTRAMUSCULAR; INTRAVENOUS at 12:34

## 2024-03-29 RX ADMIN — ONDANSETRON 4 MG: 2 INJECTION INTRAMUSCULAR; INTRAVENOUS at 12:32

## 2024-03-29 NOTE — Clinical Note
[facility] EMERGENCY DEPARTMENT  [address]  [facility] [address]  Phone: [phone]    Judith Sandoval was seen and treated in our emergency department on 3/29/2024.  She may return to work on 04/01/2024.         Thank you for choosing [facility].    Donn Ly MD

## 2024-03-29 NOTE — OUTREACH NOTE
Prep Survey      Flowsheet Row Responses   [facility] facility patient discharged from? [place]   Is LACE score < 7 ? Yes   Eligibility [facility]   Date of Admission 03/29/24   Date of Discharge 03/29/24   Discharge Disposition Home or Self Care   Discharge diagnosis Ureteral stone-cystoscopy and stent insertion   Does the patient have one of the following disease processes/diagnoses(primary or secondary)? General Surgery   Does the patient have Home health ordered? No   Is there a DME ordered? No   Prep survey completed? Yes            BRYAN DENISE - Registered Nurse

## 2024-03-29 NOTE — ED PROVIDER NOTES
Subjective   History of Present Illness  48-year-old female who presents for evaluation of suprapubic abdominal pain after recently removing a ureteral stent.  Patient actually presented to our ER yesterday with a 5 mm left UVJ stone.  She was in severe pain at that given time.  She went to the OR with Dr. Mclean of urology service and ultimately had a stent placed.  Per her report it was felt that she actually passed the 5 mm kidney stone prior to the stent being placed.  She states that lithotripsy was not performed.  She was discharged this morning and went home.  She states it seems as if the stent was coming out and she was advised that it looked like it was, now to go ahead and remove it.  She therefore pulled the stent out.  She began to have increased pain that she describes as 8 out of 10 in severity after the stent was removed.  That persisted for several hours before she decided to present to the ER.  She states that when she first presented here she was still in pain but the pain then completely eased off and now she only reports some mild discomfort in the suprapubic region.  She denies any flank tenderness or pain like she had whenever she had denied 10 pain with a kidney stone before she presented to the ER yesterday.  She denies fever or infectious symptoms.  No chest pain, cough, shortness of breath.  She did have nausea but denies any currently.  No other acute complaints.  Previous abdominal surgeries include C-sections.      Review of Systems   Constitutional:  Negative for chills, fatigue and fever.   HENT:  Negative for congestion, ear pain, postnasal drip, sinus pressure and sore throat.    Eyes:  Negative for pain, redness and visual disturbance.   Respiratory:  Negative for cough, chest tightness and shortness of breath.    Cardiovascular:  Negative for chest pain, palpitations and leg swelling.   Gastrointestinal:  Positive for abdominal pain and nausea. Negative for anal bleeding, blood  in stool, diarrhea and vomiting.   Endocrine: Negative for polydipsia and polyuria.   Genitourinary:  Positive for flank pain. Negative for difficulty urinating, dysuria, frequency and urgency.   Musculoskeletal:  Negative for arthralgias, back pain and neck pain.   Skin:  Negative for pallor and rash.   Allergic/Immunologic: Negative for environmental allergies and immunocompromised state.   Neurological:  Negative for dizziness, weakness and headaches.   Hematological:  Negative for adenopathy.   Psychiatric/Behavioral:  Negative for confusion, self-injury and suicidal ideas. The patient is not nervous/anxious.    All other systems reviewed and are negative.      Past Medical History:   Diagnosis Date    Bartholin's duct cyst     HPV (human papilloma virus) infection 2014    Kidney stone     Migraine        Allergies   Allergen Reactions    Cefaclor Unknown (See Comments)    Chocolate Unknown (See Comments)    Erythromycin Unknown (See Comments)    Penicillins Unknown (See Comments)    Sulfamethoxazole-Trimethoprim Unknown (See Comments)    Tree Nut Unknown (See Comments)       Past Surgical History:   Procedure Laterality Date    BARTHOLIN CYST MARSUPIALIZATION Left 2017     SECTION      X 4    CYSTOSCOPY URETEROSCOPY Left 3/28/2024    Procedure: CYSTOSCOPY LEFT URETEROSCOPY AND STENT INSERTION;  Surgeon: Harsha Mclean MD;  Location: [facility];  Service: Urology;  Laterality: Left;    DILATATION AND CURETTAGE      TUBAL ABDOMINAL LIGATION      TUMOR EXCISION      BACK       Family History   Adopted: Yes       Social History     Socioeconomic History    Marital status:    Tobacco Use    Smoking status: Former     Current packs/day: 0.00     Types: Cigarettes     Quit date: 2016     Years since quittin.4    Smokeless tobacco: Never    Tobacco comments:     smokes e cigarette   Vaping Use    Vaping status: Every Day    Substances: Nicotine    Devices: Disposable   Substance  and Sexual Activity    Alcohol use: Yes     Alcohol/week: 2.0 standard drinks of alcohol     Types: 2 Drinks containing 0.5 oz of alcohol per week     Comment: SOCIAL    Drug use: Yes     Types: Marijuana     Comment: MONTHLY     Sexual activity: Yes     Partners: Male           Objective   Physical Exam  Vitals and nursing note reviewed.   Constitutional:       General: She is not in acute distress.     Appearance: Normal appearance. She is well-developed. She is not toxic-appearing or diaphoretic.   HENT:      Head: Normocephalic and atraumatic.      Right Ear: External ear normal.      Left Ear: External ear normal.      Nose: Nose normal.   Eyes:      General: Lids are normal.      Pupils: Pupils are equal, round, and reactive to light.   Neck:      Trachea: No tracheal deviation.   Cardiovascular:      Rate and Rhythm: Normal rate and regular rhythm.      Pulses: No decreased pulses.      Heart sounds: Normal heart sounds. No murmur heard.     No friction rub. No gallop.   Pulmonary:      Effort: Pulmonary effort is normal. No respiratory distress.      Breath sounds: Normal breath sounds. No decreased breath sounds, wheezing, rhonchi or rales.   Abdominal:      General: Bowel sounds are normal.      Palpations: Abdomen is soft.      Tenderness: There is no abdominal tenderness in the suprapubic area. There is no guarding or rebound.   Musculoskeletal:         General: No deformity. Normal range of motion.      Cervical back: Normal range of motion and neck supple.   Lymphadenopathy:      Cervical: No cervical adenopathy.   Skin:     General: Skin is warm and dry.      Findings: No rash.   Neurological:      Mental Status: She is alert and oriented to person, place, and time.      Cranial Nerves: No cranial nerve deficit.      Sensory: No sensory deficit.   Psychiatric:         Speech: Speech normal.         Behavior: Behavior normal.         Thought Content: Thought content normal.         Judgment: Judgment  normal.         Procedures           ED Course                                     GORGE reviewed by Donn Ly MD       Medical Decision Making  Differential diagnosis includes urinary tract infection, kidney stone, ureteral spasm, diverticulitis, constipation, other unspecified etiology.    Labs show leukocytosis.  Urine shows positive nitrite with 6-10 white cells and 2 to count red blood cells.  I feel the urine changes most likely secondary to bleeding due to recent stone removal and stent placement.  However given the leukocytosis I cannot definitively rule out urinary tract infection given the suprapubic abdominal pain and dysuria.    Labs also show normal kidney function with no significant electrolyte abnormalities.    CT scan abdomen pelvis shows interval passage of a left UVJ calculus.  Persistent mild left hydronephrosis and periureteral stranding.    The patient has remained well-appearing throughout the ER course.  She was given pain medication, and nausea medication and IV fluids.    She will be discharged with pain medication, course of antibiotics, and the advised to keep outpatient urological follow-up.    Problems Addressed:  Acute abdominal pain: complicated acute illness or injury with systemic symptoms  Pyuria: complicated acute illness or injury with systemic symptoms    Amount and/or Complexity of Data Reviewed  External Data Reviewed: labs and radiology.  Labs: ordered. Decision-making details documented in ED Course.  Radiology: ordered and independent interpretation performed. Decision-making details documented in ED Course.    Risk  Prescription drug management.        Final diagnoses:   Pyuria   Acute abdominal pain       ED Disposition  ED Disposition       ED Disposition   Discharge    Condition   Stable    Comment   --               Chio Diamond, PA-C  [address]  [facility] [address]  [phone]    In 1 week           Medication List        New  Prescriptions      cefuroxime 250 MG tablet  Commonly known as: CEFTIN  Take 1 tablet by mouth 2 (Two) Times a Day for 3 days.     oxyCODONE-acetaminophen 5-325 MG per tablet  Commonly known as: PERCOCET  Take 1 tablet by mouth Every 6 (Six) Hours As Needed for Moderate Pain or Severe Pain for up to 12 doses.            Changed      * fluconazole 150 MG tablet  Commonly known as: Diflucan  Take 1 tablet weekly for 6 months  What changed: Another medication with the same name was added. Make sure you understand how and when to take each.     * fluconazole 150 MG tablet  Commonly known as: DIFLUCAN  Take 1 tablet by mouth 1 (One) Time for 1 dose.  What changed: You were already taking a medication with the same name, and this prescription was added. Make sure you understand how and when to take each.           * This list has 2 medication(s) that are the same as other medications prescribed for you. Read the directions carefully, and ask your doctor or other care provider to review them with you.                   Where to Get Your Medications        These medications were sent to [id number] - [facility] [address] AT [facility] [phone] [facility] [phone]   [address] [facility] [address]      Phone: [phone]   cefuroxime 250 MG tablet  fluconazole 150 MG tablet  oxyCODONE-acetaminophen 5-325 MG per tablet            Donn Ly MD  03/29/24 0655

## 2024-03-29 NOTE — TELEPHONE ENCOUNTER
Caller was discharge this am. She had surgery for a kidney stone and had a stent placed. She passed a stone on her own during admission prior to stent being placed. She states that Dr. Mclean told her if the stent started coming out she could remove it. She was in the shower and the stent was coming out, she removed it. She is now in severe pain. Number for Dr. Mclean urologist given to caller for further instructions.   Reason for Disposition  • [1] SEVERE post-op pain (e.g., excruciating, pain scale 8-10) AND [2] not controlled with pain medications    Additional Information  • Negative: Sounds like a life-threatening emergency to the triager  • Negative: Chest pain  • Negative: Difficulty breathing  • Negative: Acting confused (e.g., disoriented, slurred speech) or excessively sleepy  • Negative: Post-Op tonsil and adenoid surgery, symptoms or questions about  • Negative: Surgical incision symptoms and questions  • Negative: [1] Pain or burning with passing urine (urination) AND [2] male  • Negative: [1] Pain or burning with passing urine (urination) AND [2] female  • Negative: Constipation  • Negative: New or worsening leg (calf, thigh) pain  • Negative: New or worsening leg swelling  • Negative: Dizziness is severe, or persists > 24 hours after surgery  • Negative: Pain, redness, swelling, or pus at IV Site  • Negative: Symptoms arising from use of a urinary catheter (e.g., Coude, Medina)  • Negative: Cast problems or questions  • Negative: Medication question  • Negative: [1] Widespread rash AND [2] bright red, sunburn-like  • Negative: [1] SEVERE headache AND [2] after spinal (epidural) anesthesia  • Negative: [1] Vomiting AND [2] persists > 4 hours  • Negative: [1] Vomiting AND [2] abdomen looks much more swollen than usual  • Negative: [1] Drinking very little AND [2] dehydration suspected (e.g., no urine > 12 hours, very dry mouth, very lightheaded)  • Negative: Patient sounds very sick or weak to the  "triager  • Negative: Sounds like a serious complication to the triager  • Negative: Fever > 100.4 F (38.0 C)    Answer Assessment - Initial Assessment Questions  1. SYMPTOM: \"What's the main symptom you're concerned about?\" (e.g., pain, fever, vomiting)      pain  2. ONSET: \"When did pain  start?\"      When I removed the stent  3. SURGERY: \"What surgery did you have?\"      Cysto with stent placement  4. DATE of SURGERY: \"When was the surgery?\"       3/28  5. ANESTHESIA: \" What type of anesthesia did you have?\" (e.g., general, spinal, epidural, local)      gen  6. PAIN: \"Is there any pain?\" If Yes, ask: \"How bad is it?\"  (Scale 1-10; or mild, moderate, severe)      severe  7. FEVER: \"Do you have a fever?\" If Yes, ask: \"What is your temperature, how was it measured, and when did it start?\"      no  8. VOMITING: \"Is there any vomiting?\" If Yes, ask: \"How many times?\"      no  9. BLEEDING: \"Is there any bleeding?\" If Yes, ask: \"How much?\" and \"Where?\"      mild  10. OTHER SYMPTOMS: \"Do you have any other symptoms?\" (e.g., drainage from wound, painful urination, constipation)        no    Protocols used: Post-Op Symptoms and Questions-ADULT-    "

## 2024-03-29 NOTE — DISCHARGE INSTRUCTIONS
Take pain medication as needed.    Take antibiotics as prescribed.    Make sure to drink plenty of fluids.    Follow-up with urology on outpatient basis.

## 2024-04-01 ENCOUNTER — TRANSITIONAL CARE MANAGEMENT TELEPHONE ENCOUNTER (OUTPATIENT)
Dept: CALL CENTER | Facility: HOSPITAL | Age: 49
End: 2024-04-01
Payer: COMMERCIAL

## 2024-04-01 NOTE — OUTREACH NOTE
Call Center TCM Note      Flowsheet Row Responses   [facility] patient discharged from? [facility]   Does the patient have one of the following disease processes/diagnoses(primary or secondary)? General Surgery   TCM attempt successful? Yes   Call start time 1005   Call end time 1015   Discharge diagnosis Ureteral stone-cystoscopy and stent insertion   Meds reviewed with patient/caregiver? Yes   Is the patient having any side effects they believe may be caused by any medication additions or changes? No   Does the patient have all medications related to this admission filled (includes all antibiotics, pain medications, etc.) Yes   Prescription comments New rx's Phenazopyridine, Pericolace in place   Is the patient taking all medications as directed (includes completed medication regime)? Yes   Comments TCM APPT WITH PCP AVI BULLOCK IS 04/04/2024   Does the patient have an appointment with their PCP within 7-14 days of discharge? Yes   Has home health visited the patient within 72 hours of discharge? N/A   Psychosocial issues? No   Did the patient receive a copy of their discharge instructions? Yes   Nursing interventions Reviewed instructions with patient   What is the patient's perception of their health status since discharge? Improving   Nursing interventions Nurse provided patient education   Is the patient /caregiver able to teach back basic post-op care? Continue use of incentive spirometry at least 1 week post discharge, Lifting as instructed by MD in discharge instructions, No tub bath, swimming, or hot tub until instructed by MD, Practice 'cough and deep breath', Drive as instructed by MD in discharge instructions, Take showers only when approved by MD-sponge bathe until then   Is the patient/caregiver able to teach back steps to recovery at home? Set small, achievable goals for return to baseline health, Practice good oral hygiene, Eat a well-balance diet, Rest and rebuild strength, gradually  increase activity, Weigh daily, Make a list of questions for surgeon's appointment   If the patient is a current smoker, are they able to teach back resources for cessation? Not a smoker   Is the patient/caregiver able to teach back the hierarchy of who to call/visit for symptoms/problems? PCP, Specialist, Home health nurse, Urgent Care, ED, 911 Yes   TCM call completed? Yes   Wrap up additional comments D/C DX: Ureteral stone-cystoscopy and stent insertion - Pt is improving form kidney stones, stent is out. Pt big issue right now is severe constipation. Despite Pericolace, two tablets bid since Friday, pt has not had a BM for 5 days. Pt is sched with PCP AVI Diamond for TCM APPT on 04/04/2024, but pt is asking if PCP can please offer some recommendations re: constipation by phone today please.   Call end time 1015            Marlyn Hewitt MA    4/1/2024, 10:34 EDT

## 2024-04-04 ENCOUNTER — OFFICE VISIT (OUTPATIENT)
Dept: FAMILY MEDICINE CLINIC | Facility: CLINIC | Age: 49
End: 2024-04-04
Payer: COMMERCIAL

## 2024-04-04 VITALS
HEIGHT: 65 IN | DIASTOLIC BLOOD PRESSURE: 72 MMHG | TEMPERATURE: 97.8 F | WEIGHT: 126.3 LBS | BODY MASS INDEX: 21.04 KG/M2 | HEART RATE: 73 BPM | OXYGEN SATURATION: 98 % | SYSTOLIC BLOOD PRESSURE: 108 MMHG

## 2024-04-04 DIAGNOSIS — N20.0 KIDNEY STONE: ICD-10-CM

## 2024-04-04 DIAGNOSIS — R31.29 OTHER MICROSCOPIC HEMATURIA: Primary | ICD-10-CM

## 2024-04-04 NOTE — PROGRESS NOTES
Subjective   Judith Sandoval is a 48 y.o. female  Hospital Follow Up Visit (Follow up from  for Pyuria from , removed stent on Saturday, doing well. Cannot see Harsha Mclean who did surgery due to insurance )      History of Present Illness    Judith Sandoval.  1975, presents today for follow-up from recent kidney stone.    The patient has consulted with several urologists at [facility] due to her history of recurrent urinary tract infections (UTIs). The urologist did not schedule a follow-up appointment on his initial orders, leading to her return to the Emergency Room. The urologist recommended a lithotripsy and stent placement, which should be in place for 3 to 5 days. The patient was informed that the stent could be performed independently unless she opted for an in-office visit. She has no history of stent placement. The patient's previous urologist did not provide a follow-up appointment. She inquired with the urologist about her UTI diagnosis, who prescribed Pyridium. The urologist informed her that if the stent became dislodged or caused discomfort, the stent would be removed. The patient experienced discomfort while showering, which led to her removing the stent. She experienced severe lower abdominal pain upon exiting the shower, rendering her unable to rise from the toilet and bending over. She returned to the ER where a CT scan was performed to confirm the resolution of the stone. The ER doctor suspected that the stone may have passed fragments. The patient was administered intravenous fluids, analgesics, and antibiotics. She was advised to follow up in a week. The patient experienced severe left kidney pain six days ago, which she managed with Percocet. She declined to return to the ER. Currently, she is feeling well. She passed the last stone on Saturday, followed by severe constipation. The patient has a history of constipation. She denies dysuria but reports frequent  urination. The patient has a history of recurrent UTIs, approximately 20 years ago, which were successfully managed with lithotripsy and pain medication.    The following portions of the patient's history were reviewed and updated as appropriate: allergies, current medications, past social history and problem list    Review of Systems   Constitutional: Negative.    Respiratory: Negative.     Cardiovascular: Negative.    Genitourinary: Negative.        Objective     Vitals:    04/04/24 1344   BP: 108/72   Pulse: 73   Temp: 97.8 °F (36.6 °C)   SpO2: 98%       Physical Exam  Vitals and nursing note reviewed.   Constitutional:       General: She is not in acute distress.     Appearance: Normal appearance. She is well-developed. She is not ill-appearing, toxic-appearing or diaphoretic.   Eyes:      Conjunctiva/sclera: Conjunctivae normal.   Cardiovascular:      Rate and Rhythm: Normal rate and regular rhythm.   Pulmonary:      Effort: Pulmonary effort is normal.      Breath sounds: Normal breath sounds.   Neurological:      Mental Status: She is alert and oriented to person, place, and time.      Coordination: Coordination normal.   Psychiatric:         Attention and Perception: She is attentive.         Mood and Affect: Mood normal.         Behavior: Behavior normal.         Thought Content: Thought content normal.         Judgment: Judgment normal.         Assessment & Plan     Diagnoses and all orders for this visit:    1. Other microscopic hematuria (Primary)  -     Urinalysis With Microscopic - Urine, Clean Catch; Future  -     Urine Culture - Urine, Urine, Clean Catch; Future    2. Kidney stone  -     Urinalysis With Microscopic - Urine, Clean Catch; Future  -     Urine Culture - Urine, Urine, Clean Catch; Future    1. Nephrolithiasis.  The patient's condition has shown significant improvement, as evidenced by a CT scan and satisfactory kidney function. The patient is advised to provide a urine specimen at any  [facility] at her convenience in 2 weeks. Should the patient experience symptoms of pain or pressure in 2 weeks, she is advised to seek immediate medical attention at an earlier date. A urinalysis with microscopic examination and urine culture will be conducted. If the patient remains asymptomatic in 2 weeks, a referral to our urology group will be considered.    Transcribed from ambient dictation for Chio Diamond PA-C by Marko Mo.  04/04/24   16:42 EDT  I spent 15 minutes in patient care: Reviewing records prior to the visit, examining the patient, entering orders and documentation    Part of this note may be an electronic transcription/translation of spoken language to printed text using the Dragon Dictation System.      Patient or patient representative verbalized consent to the visit recording.  I have personally performed the services described in this document as transcribed by the above individual, and it is both accurate and complete.

## 2024-04-11 ENCOUNTER — TELEPHONE (OUTPATIENT)
Dept: FAMILY MEDICINE CLINIC | Facility: CLINIC | Age: 49
End: 2024-04-11
Payer: COMMERCIAL

## 2024-04-11 RX ORDER — FLUCONAZOLE 150 MG/1
TABLET ORAL
Qty: 2 TABLET | Refills: 1 | Status: SHIPPED | OUTPATIENT
Start: 2024-04-11

## 2024-04-18 ENCOUNTER — LAB (OUTPATIENT)
Dept: LAB | Facility: HOSPITAL | Age: 49
End: 2024-04-18
Payer: COMMERCIAL

## 2024-04-18 DIAGNOSIS — N20.0 KIDNEY STONE: ICD-10-CM

## 2024-04-18 DIAGNOSIS — R35.0 URINARY FREQUENCY: ICD-10-CM

## 2024-04-18 DIAGNOSIS — R31.29 OTHER MICROSCOPIC HEMATURIA: ICD-10-CM

## 2024-04-18 DIAGNOSIS — R30.0 BURNING WITH URINATION: ICD-10-CM

## 2024-04-18 LAB
BACTERIA UR QL AUTO: ABNORMAL /HPF
BILIRUB UR QL STRIP: NEGATIVE
BILIRUB UR QL STRIP: NEGATIVE
CLARITY UR: CLEAR
CLARITY UR: CLEAR
COLOR UR: YELLOW
COLOR UR: YELLOW
GLUCOSE UR STRIP-MCNC: NEGATIVE MG/DL
GLUCOSE UR STRIP-MCNC: NEGATIVE MG/DL
HGB UR QL STRIP.AUTO: NEGATIVE
HGB UR QL STRIP.AUTO: NEGATIVE
HOLD SPECIMEN: NORMAL
HYALINE CASTS UR QL AUTO: ABNORMAL /LPF
KETONES UR QL STRIP: NEGATIVE
KETONES UR QL STRIP: NEGATIVE
LEUKOCYTE ESTERASE UR QL STRIP.AUTO: NEGATIVE
LEUKOCYTE ESTERASE UR QL STRIP.AUTO: NEGATIVE
NITRITE UR QL STRIP: NEGATIVE
NITRITE UR QL STRIP: NEGATIVE
PH UR STRIP.AUTO: 6.5 [PH] (ref 5–8)
PH UR STRIP.AUTO: 6.5 [PH] (ref 5–8)
PROT UR QL STRIP: NEGATIVE
PROT UR QL STRIP: NEGATIVE
RBC # UR STRIP: ABNORMAL /HPF
REF LAB TEST METHOD: ABNORMAL
SP GR UR STRIP: 1.02 (ref 1–1.03)
SP GR UR STRIP: 1.02 (ref 1–1.03)
SQUAMOUS #/AREA URNS HPF: ABNORMAL /HPF
UROBILINOGEN UR QL STRIP: NORMAL
UROBILINOGEN UR QL STRIP: NORMAL
WBC # UR STRIP: ABNORMAL /HPF

## 2024-04-18 PROCEDURE — 87086 URINE CULTURE/COLONY COUNT: CPT

## 2024-04-18 PROCEDURE — 81001 URINALYSIS AUTO W/SCOPE: CPT

## 2024-04-19 LAB — BACTERIA SPEC AEROBE CULT: NORMAL

## 2024-04-29 RX ORDER — LEVOCETIRIZINE DIHYDROCHLORIDE 5 MG/1
5 TABLET, FILM COATED ORAL EVERY EVENING
Qty: 30 TABLET | Refills: 11 | OUTPATIENT
Start: 2024-04-29

## 2024-04-29 NOTE — TELEPHONE ENCOUNTER
Caller: Judith Sandoval    Relationship: Self    Best call back number: [phone]     Requested Prescriptions:   Requested Prescriptions     Pending Prescriptions Disp Refills    levocetirizine (XYZAL) 5 MG tablet 30 tablet 11     Sig: Take 1 tablet by mouth Every Evening.        Pharmacy where request should be sent: [facility] #[id number] [address] AT [facility] [phone] [facility] [phone] FX     Last office visit with prescribing clinician: 4/4/2024   Last telemedicine visit with prescribing clinician: Visit date not found   Next office visit with prescribing clinician: Visit date not found     Additional details provided by patient: PATIENT IS OUT    Does the patient have less than a 3 day supply:  [x] Yes  [] No    Would you like a call back once the refill request has been completed: [] Yes [x] No    If the office needs to give you a call back, can they leave a voicemail: [] Yes [x] No    Amanda Summers Rep   04/29/24 14:37 EDT

## 2024-05-31 RX ORDER — DOCUSATE SODIUM 100 MG/1
100 CAPSULE, LIQUID FILLED ORAL 2 TIMES DAILY
Qty: 60 CAPSULE | OUTPATIENT
Start: 2024-05-31

## 2024-08-26 DIAGNOSIS — F90.2 ATTENTION DEFICIT HYPERACTIVITY DISORDER (ADHD), COMBINED TYPE: Primary | ICD-10-CM

## 2024-11-20 ENCOUNTER — TELEPHONE (OUTPATIENT)
Dept: FAMILY MEDICINE CLINIC | Facility: CLINIC | Age: 49
End: 2024-11-20
Payer: COMMERCIAL

## 2024-11-20 DIAGNOSIS — G43.C0 PERIODIC HEADACHE SYNDROME, NOT INTRACTABLE: Primary | ICD-10-CM

## 2024-11-20 NOTE — TELEPHONE ENCOUNTER
Caller: Judith Sandoval    Relationship: Self    Best call back number: [phone]     What is the medical concern/diagnosis: MIGRAINES    What specialty or service is being requested: NEUROLOGY        Any additional details: PATIENT REQUESTING A REFERRAL TO A NEUROLOGIST. HAVING MIGRAINES MORE FREQUENT

## 2024-11-20 NOTE — TELEPHONE ENCOUNTER
I have placed a referral.  Notify patient that neurology is scheduling quite a ways out, based on her insurance to try to get her in with whoever we can have her see the soonest please.

## 2024-11-26 ENCOUNTER — TELEPHONE (OUTPATIENT)
Dept: FAMILY MEDICINE CLINIC | Facility: CLINIC | Age: 49
End: 2024-11-26
Payer: COMMERCIAL

## 2024-11-26 NOTE — TELEPHONE ENCOUNTER
I would be glad to put in a new referral but in my experience [facility] is booked out far other than [facility].  Alisa what are your thoughts on the timeline at  and with her insurance does she have any other options that may get her in sooner at another location?

## 2024-11-26 NOTE — TELEPHONE ENCOUNTER
[facility] in [place] says they are in the middle of dec for appts and confirmed they take medicaid. Will call pt.

## 2024-11-26 NOTE — TELEPHONE ENCOUNTER
[facility] is still very far out. I can send her to [facility]. There is a neurology office in [place] that accepts medicaid, but I do not know how far out they are booking. I will call. [facility], [facility], and [facility] are the only nearby neuro that take medicaid.

## 2024-11-26 NOTE — TELEPHONE ENCOUNTER
Caller: Judith Sandoval    Relationship: Self    Best call back number:      [phone] (Mobile)     What is the medical concern/diagnosis:  MIGRAINES     What specialty or service is being requested: NEUROLOGY     What is the provider, practice or medical service name: [facility]     Any additional details:  PATIENT SAID [facility] COULD NOT GET HER IN FOR A LONG TIME AND SHE WOULD LIKE A REFERRAL TO [facility]

## 2024-12-23 ENCOUNTER — TELEPHONE (OUTPATIENT)
Dept: FAMILY MEDICINE CLINIC | Facility: CLINIC | Age: 49
End: 2024-12-23
Payer: COMMERCIAL

## 2024-12-23 RX ORDER — FLUCONAZOLE 150 MG/1
TABLET ORAL
Qty: 2 TABLET | Refills: 1 | Status: SHIPPED | OUTPATIENT
Start: 2024-12-23

## 2024-12-23 NOTE — TELEPHONE ENCOUNTER
Caller: Judith Sandoval    Relationship: Self    Best call back number: [phone]     What medication are you requesting: DI FLUCONAZOLE    What are your current symptoms: IT IS LIKE COTTAGE CHEESE IS WHAT SHE STATED    How long have you been experiencing symptoms: FEW DAYS    Have you had these symptoms before:    [x] Yes  [] No    Have you been treated for these symptoms before:   [x] Yes  [] No    If a prescription is needed, what is your preferred pharmacy and phone number: [facility] #[id number] - [facility] [address]  AT [facility] [phone] [facility] [phone]      Additional notes:CALL PATIENT
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
## 2025-02-13 ENCOUNTER — OFFICE VISIT (OUTPATIENT)
Dept: OBSTETRICS AND GYNECOLOGY | Facility: CLINIC | Age: 50
End: 2025-02-13
Payer: COMMERCIAL

## 2025-02-13 VITALS
BODY MASS INDEX: 21.23 KG/M2 | HEIGHT: 65 IN | SYSTOLIC BLOOD PRESSURE: 114 MMHG | DIASTOLIC BLOOD PRESSURE: 82 MMHG | WEIGHT: 127.4 LBS

## 2025-02-13 DIAGNOSIS — Z11.3 SCREENING EXAMINATION FOR STD (SEXUALLY TRANSMITTED DISEASE): ICD-10-CM

## 2025-02-13 DIAGNOSIS — Z01.419 WOMEN'S ANNUAL ROUTINE GYNECOLOGICAL EXAMINATION: Primary | ICD-10-CM

## 2025-02-13 DIAGNOSIS — N89.8 VAGINAL DISCHARGE: ICD-10-CM

## 2025-02-13 RX ORDER — LISDEXAMFETAMINE DIMESYLATE 50 MG
50 CAPSULE ORAL EVERY MORNING
COMMUNITY
Start: 2024-12-26

## 2025-02-19 ENCOUNTER — TELEPHONE (OUTPATIENT)
Dept: OBSTETRICS AND GYNECOLOGY | Facility: CLINIC | Age: 50
End: 2025-02-19
Payer: COMMERCIAL

## 2025-02-19 NOTE — TELEPHONE ENCOUNTER
PER PT SINCE HER ANNUAL EXAM SHE IS HAVING THICKER AND MORE DISCHARGE IS THIS NORMAL  -PLEASE ADVISE

## 2025-02-25 ENCOUNTER — OFFICE VISIT (OUTPATIENT)
Dept: NEUROLOGY | Facility: CLINIC | Age: 50
End: 2025-02-25
Payer: COMMERCIAL

## 2025-02-25 ENCOUNTER — PATIENT MESSAGE (OUTPATIENT)
Dept: NEUROLOGY | Facility: CLINIC | Age: 50
End: 2025-02-25

## 2025-02-25 ENCOUNTER — SPECIALTY PHARMACY (OUTPATIENT)
Dept: ONCOLOGY | Facility: HOSPITAL | Age: 50
End: 2025-02-25
Payer: COMMERCIAL

## 2025-02-25 VITALS
WEIGHT: 132.4 LBS | HEIGHT: 65 IN | DIASTOLIC BLOOD PRESSURE: 68 MMHG | HEART RATE: 69 BPM | SYSTOLIC BLOOD PRESSURE: 108 MMHG | BODY MASS INDEX: 22.06 KG/M2 | OXYGEN SATURATION: 99 %

## 2025-02-25 DIAGNOSIS — G43.009 MIGRAINE WITHOUT AURA AND WITHOUT STATUS MIGRAINOSUS, NOT INTRACTABLE: Primary | ICD-10-CM

## 2025-02-25 LAB
NCCN CRITERIA FLAG: NORMAL
TYRER CUZICK SCORE: 8.6

## 2025-02-25 PROCEDURE — 1160F RVW MEDS BY RX/DR IN RCRD: CPT | Performed by: NURSE PRACTITIONER

## 2025-02-25 PROCEDURE — 1159F MED LIST DOCD IN RCRD: CPT | Performed by: NURSE PRACTITIONER

## 2025-02-25 PROCEDURE — 99214 OFFICE O/P EST MOD 30 MIN: CPT | Performed by: NURSE PRACTITIONER
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
## 2025-02-26 NOTE — TELEPHONE ENCOUNTER
Patient has been contacted several times on different days about her concerns with no reply. Discontinuing message.

## 2025-03-03 ENCOUNTER — TELEPHONE (OUTPATIENT)
Dept: OBSTETRICS AND GYNECOLOGY | Facility: CLINIC | Age: 50
End: 2025-03-03
Payer: COMMERCIAL

## 2025-03-03 PROBLEM — G43.009 MIGRAINE WITHOUT AURA AND WITHOUT STATUS MIGRAINOSUS, NOT INTRACTABLE: Status: ACTIVE | Noted: 2025-03-03

## 2025-03-03 RX ORDER — MEDROXYPROGESTERONE ACETATE 10 MG
10 TABLET ORAL DAILY
Qty: 10 TABLET | Refills: 0 | Status: SHIPPED | OUTPATIENT
Start: 2025-03-03 | End: 2025-03-13

## 2025-03-03 NOTE — TELEPHONE ENCOUNTER
If her cycle does not stop in the next couple of days, I have sent in a prescription for 10 days of Provera to help stop her bleeding.  I recommend continuing to observe, however if this becomes a pattern for the next 2-3 menstrual cycles, I recommend she be evaluated in clinic and with ultrasound.  Thanks,    Jackie Ross MD

## 2025-03-03 NOTE — TELEPHONE ENCOUNTER
Caller: Judith Sandoval     Relationship: SELF     Best call back number: [phone]    What is your medical concern? DID ANNUAL IN FEB, ALL WAS GOOD - THEN LAST SUNDAY PT STARTED CYCLE, IT'S BEEN GOING SINCE THEN, SLOWED DOWN AROUND DAY 4, BUT THEN STARTED BACK ON DAY 6 AND HAS BEEN LIKE A WHOLE NEW PERIOD.  PT IS CONCERNED WONDERING IF THIS IS NORMAL - HAVING MOSTLY CLOTS SINCE IT STARTED BACK    How long has this issue been going on? SINCE LAST SUNDAY 2/23/25    Is your provider already aware of this issue? NO    Have you been treated for this issue? NO    PT ASKING TO SPEAK TO A NURSE TO ADVISE HER

## 2025-03-03 NOTE — TELEPHONE ENCOUNTER
Pt states that she has never had a cycle this long before. The only thing she had previously had that was irregular was the frequency of when she had a menstrual cycle but typically only lasts four to five days.     Patient denies pain. She normally has a heavy flow in her first few days. She has been having clotting when she has heavy, beginning of her cycle. As of now cycle is using a super tampon 3-4 hours.

## 2025-03-03 NOTE — TELEPHONE ENCOUNTER
A short course of progesterone only medication would have minimal side effects, but like any hormonal medication, could cause nausea, headache or GI upset.     Jackie Ross MD

## 2025-03-05 ENCOUNTER — TELEPHONE (OUTPATIENT)
Dept: NEUROLOGY | Facility: CLINIC | Age: 50
End: 2025-03-05
Payer: COMMERCIAL

## 2025-03-10 ENCOUNTER — TELEPHONE (OUTPATIENT)
Dept: OBSTETRICS AND GYNECOLOGY | Facility: CLINIC | Age: 50
End: 2025-03-10
Payer: COMMERCIAL

## 2025-03-10 NOTE — TELEPHONE ENCOUNTER
WHITLEY SEE     [phone]    CALLING W/ QUESTIONS RE: PROGESTERONE     ONCE SHE FINISHES TAKING WILL SHE STAY OFF HER CYCLE

## 2025-03-11 NOTE — TELEPHONE ENCOUNTER
"Pt informed and stated understanding.  Pt would like to know what to expect as \"normal\" guidelines or what she should look for or be expecting.     She reports that she has very heavy periods that she uses an ultra tampon with a pad, changing every 30 minutes for the first couple of days of her cycle.     She states that if she bleeds for a normal period for five days and then this happens again where she starts her period or it does not stop, should she call back again? What are the expectations?      "

## 2025-03-11 NOTE — TELEPHONE ENCOUNTER
Pt informed and stated understanding.  Pt is not bleeding any more and would like to make sure she should keep taking full ten days. She has five days left.

## 2025-03-12 ENCOUNTER — HOSPITAL ENCOUNTER (OUTPATIENT)
Dept: MAMMOGRAPHY | Facility: HOSPITAL | Age: 50
Discharge: HOME | End: 2025-03-12
Admitting: RADIOLOGY
Payer: COMMERCIAL

## 2025-03-12 DIAGNOSIS — R92.8 ABNORMAL MAMMOGRAM: ICD-10-CM

## 2025-03-12 PROCEDURE — G0279 TOMOSYNTHESIS, MAMMO: HCPCS

## 2025-03-12 PROCEDURE — 77066 DX MAMMO INCL CAD BI: CPT

## 2025-03-12 NOTE — TELEPHONE ENCOUNTER
Yes I would call back again for the things she listed, as we would need to do a repeat work up for AUB as it has been 2 years since she last had endometrial sampling. Any change from her baseline would need to be addressed.     Jackie Ross MD

## 2025-03-18 ENCOUNTER — TELEPHONE (OUTPATIENT)
Dept: OBSTETRICS AND GYNECOLOGY | Facility: CLINIC | Age: 50
End: 2025-03-18

## 2025-03-18 ENCOUNTER — TELEPHONE (OUTPATIENT)
Dept: NEUROLOGY | Facility: CLINIC | Age: 50
End: 2025-03-18
Payer: COMMERCIAL

## 2025-03-18 RX ORDER — MEDROXYPROGESTERONE ACETATE 10 MG
10 TABLET ORAL DAILY
Qty: 30 TABLET | Refills: 2 | Status: SHIPPED | OUTPATIENT
Start: 2025-03-18 | End: 2025-04-07

## 2025-03-18 NOTE — TELEPHONE ENCOUNTER
Pt informed and stated understanding.      Is there anything pt can do in the meantime for the active bleeding she is having?

## 2025-03-18 NOTE — TELEPHONE ENCOUNTER
If the Provera helped, a prescription for daily Provera until her next appointment has been sent to her pharmacy. When is she scheduled for?    Jackie Ross MD

## 2025-03-18 NOTE — TELEPHONE ENCOUNTER
Provider:  DR SULLIVAN    Caller: WHITLEY MAYI     Phone Number: [phone]     Reason for Call: PATIENT IS CALLING BECAUSE SHE HAD CALLED ABOUT PROLONGED BLEEDING STARTING ON 2/23//PROVIDER CALLED IN PROGESTERONE AND SHE STARTED IT ON 3/6 STOPPED BLEEDING ON 14-16TH OF MARCH BUT FINISHING THE PROGESTERONE ON 3/15//HOWEVER SHE STARTED BLEEDING AGAIN ON 3/17/25 AND ITS FULL ON PERIOD BLEEDING//NOT SURE WHAT SHE NEEDS TO DO//ALSO WAS TREATED ON 3/17 WITH A UTI AT URGENT CARE//PLEASE FOLLOW UP

## 2025-03-18 NOTE — TELEPHONE ENCOUNTER
Telephone with Jackie Ross MD (03/10/2025)     Pt reports that she is still having a heavy period but it is unusual for how long she is bleeding, not a change in the heaviness of bleeding. She was instructed to call back if she had a change from her baseline previously as well.     As far as her UTI, she has a history of frequent UTIs and bad ones. She has previously been to urology for them. Sunday she did not have bleeding. But she had increased frequency with incomplete voiding. She had pain yesterday with urination and back pain. Went to [facility] yesterday. She had blood in her urine in sample. She was prescribed Levaquin and last night she went home and she would have bleeding when urinating. She did not have any bleeding except with urination but thought it was vaginal bleeding. Went to bed with panty liner with no bleeding. When wiping she had dark red blood. This has gotten heavier throughout the day and is now on her pad. Bleeding currently is slight and with panty liner. Blood in toilet and when wiping with clotting every time she is wiping.     Patient is concerned with her cycle starting again possibly and/ or the symptoms of UTI. She feels that this is menstrual bleeding at this time. She is also anemic and does not feel well. Reports dizziness, lightheadedness, hot and cold chills, sweating, migraine, she does not take the iron that she is prescribed as it makes her constipated. She use to take docusate sodium, MiraLAX or probiotics (currently using daily). She states that she is nervous she is only able to use the bathroom without iron every three days while taking these medications. Denies syncope, she is drinking flavored water, eats mostly vegetables and chicken with some dairy, eggs.     Patient is nervous that she keeps having bleeding.

## 2025-03-18 NOTE — TELEPHONE ENCOUNTER
Caller: Judith Sandoval    Relationship: Self    Best call back number: [phone]      Which medication are you concerned about: IMITREX    Who prescribed you this medication: PCP     When did you start taking this medication: YEARS    What are your concerns: PT STATES IMITREX IS NOT WORKING WELL FOR HER ANYMORE AND SHE WOULD LIKE TO KNOW IF UBRELVY WOULD BE AN OPTION TO HELP WITH HER MIGRAINES.    How long have you had these concerns: 02/25/25      PLEASE REVIEW AND ADVISE

## 2025-03-19 ENCOUNTER — SPECIALTY PHARMACY (OUTPATIENT)
Dept: ONCOLOGY | Facility: HOSPITAL | Age: 50
End: 2025-03-19
Payer: COMMERCIAL

## 2025-03-19 ENCOUNTER — TELEPHONE (OUTPATIENT)
Dept: FAMILY MEDICINE CLINIC | Facility: CLINIC | Age: 50
End: 2025-03-19
Payer: COMMERCIAL

## 2025-03-19 NOTE — TELEPHONE ENCOUNTER
Caller: Judith Sandoval    Relationship: Self    Best call back number: [phone]     What medication are you requesting: CLOBETASOL PROPIONATE    What are your current symptoms: SCALP SOLUTION     How long have you been experiencing symptoms:     Have you had these symptoms before:    [x] Yes  [] No    Have you been treated for these symptoms before:   [x] Yes  [] No    If a prescription is needed, what is your preferred pharmacy and phone number:  [facility]     Additional notes: PATIENT REQUESTING REFILL

## 2025-03-19 NOTE — TELEPHONE ENCOUNTER
Please notify patient she will need to schedule an in person appointment for me to be able to prescribe this medication for her

## 2025-03-19 NOTE — TELEPHONE ENCOUNTER
Caller: Judith Sandoval     Relationship: Self     Best call back number: [phone]      What medication are you requesting: CLOBETASOL PROPIONATE     What are your current symptoms: SCALP SOLUTION      How long have you been experiencing symptoms:      Have you had these symptoms before:                                  [x] Yes  [] No     Have you been treated for these symptoms before:              [x] Yes  [] No     If a prescription is needed, what is your preferred pharmacy and phone number:  [facility]      Additional notes: PATIENT REQUESTING REFILL

## 2025-03-20 ENCOUNTER — TELEPHONE (OUTPATIENT)
Dept: NEUROLOGY | Facility: CLINIC | Age: 50
End: 2025-03-20
Payer: COMMERCIAL

## 2025-03-20 NOTE — TELEPHONE ENCOUNTER
Called patient and she stated that she was getting the script filled and no further action needed.

## 2025-03-20 NOTE — TELEPHONE ENCOUNTER
Caller: Judith Sandoval    Relationship: Self    Best call back number:   Telephone Information:   Mobile [phone]     Which medication are you concerned about: UBRELVY    What are your concerns: WAS A PRIOR AUTH STARTED ? AND ALSO SHE STATES THE PHARMACY BELOW ADVISED THE MEDICATION  WAS NEVER RECEIVED       [facility] #[id number] - [facility] [address] AT [facility] - [phone]  - [phone] [place] [address] [address] [facility] [address]  Phone: [phone]  Fax: [phone]

## 2025-03-24 ENCOUNTER — PATIENT MESSAGE (OUTPATIENT)
Dept: NEUROLOGY | Facility: CLINIC | Age: 50
End: 2025-03-24
Payer: COMMERCIAL

## 2025-04-06 DIAGNOSIS — N93.9 ABNORMAL UTERINE BLEEDING (AUB): Primary | ICD-10-CM

## 2025-04-06 NOTE — PROGRESS NOTES
"Subjective   Chief Complaint   Patient presents with    Follow-up     Sono today.  Endometrial biopsy.     Judith Sandoval is a 49 y.o. year old .  No LMP recorded (lmp unknown).  She reports she is having heavy bleeding today, and is having clots the size of a dime or so. She has been compliant on the Provera 10mg po every day since 3/18/25 (and had a 10 day course prior in February). Overall she has been very tired and fatigued from bleeding, and has only had a few days of relief since 25.     The following portions of the patient's history were reviewed and updated as appropriate:current medications, allergies, past medical history, and past surgical history    Social History    Tobacco Use      Smoking status: Former        Packs/day: 0.00        Types: Cigarettes        Quit date: 2016        Years since quittin.4      Smokeless tobacco: Never      Tobacco comments: smokes e cigarette         Objective   /80 (BP Location: Left arm, Patient Position: Sitting, Cuff Size: Adult)   Ht 165.1 cm (65\")   Wt 59 kg (130 lb)   LMP  (LMP Unknown) Comment: Abnormal bleeding since 25.  Breastfeeding No   BMI 21.63 kg/m²     General:  well developed; well nourished  no acute distress  mentation appropriate   Lungs:  breathing is unlabored   Heart:  Not performed.   Pelvis: Clinical staff was present for exam  External genitalia:  normal appearance of the external genitalia including Bartholin's and Verdigre's glands.  :  urethral meatus normal; urethra normal:  Vaginal:  normal pink mucosa without prolapse or lesions. blood present -  moderate amount;  Cervix:  normal appearance.  Uterus:  normal size, shape and consistency. retroverted;     Lab Review   No data reviewed    Imaging   Pelvic ultrasound report        Assessment   AUB, suspect anovulatory      Plan   Extensively discussed abnormal uterine bleeding in the perimenopausal period, and recommendation for endometrial " sampling.  Patient in agreement. See below for details.  As her bleeding has continued to be prolonged despite daily Provera use, recommend switching to Aygestin 5 mg daily.  Can increase up to 3 times daily dosing if needed for bleeding control.  CBC, TSH and A1c ordered.   Discussed if bleeding refractory to medical management, would recommend proceeding with surgical management with repeat hysteroscopy D&C with possible ablation versus hysterectomy.  Patient voiced understanding, and all questions answered to the best my ability.  The importance of keeping all planned follow-up and taking all medications as prescribed was emphasized.  Follow up 3 months for bleeding check on Aygestin or sooner PRN     New Medications Ordered This Visit   Medications    norethindrone (Aygestin) 5 MG tablet     Sig: Take 1 tablet by mouth Daily.     Dispense:  90 tablet     Refill:  3          This note was electronically signed.    Jackie Ross MD  April 7, 2025       Endometrial Biopsy    Date of procedure:  4/7/2025    Indications: AUB at age >46yo    Procedure documentation:    The cervix was grasped posterior at the 5 o'clock position.  The cavity sounded to 7 centimeters.  An endometrial biopsy specimen was obtained after 1 pass.  The tissue was sent for permanent histopathologic evaluation.  Tenaculum was removed from the cervix with scant bleeding.    Post procedure instructions: She was instructed to call us in 1 week's time if she has not heard from us otherwise.  If there is any significant fever or excessive bleeding or pain she is to call immediately.    This note was electronically signed.    Jackie Ross MD  April 7, 2025

## 2025-04-07 ENCOUNTER — OFFICE VISIT (OUTPATIENT)
Dept: OBSTETRICS AND GYNECOLOGY | Facility: CLINIC | Age: 50
End: 2025-04-07
Payer: COMMERCIAL

## 2025-04-07 ENCOUNTER — LAB (OUTPATIENT)
Dept: LAB | Facility: HOSPITAL | Age: 50
End: 2025-04-07
Payer: COMMERCIAL

## 2025-04-07 VITALS
DIASTOLIC BLOOD PRESSURE: 80 MMHG | BODY MASS INDEX: 21.66 KG/M2 | WEIGHT: 130 LBS | SYSTOLIC BLOOD PRESSURE: 120 MMHG | HEIGHT: 65 IN

## 2025-04-07 DIAGNOSIS — Z13.9 SPECIAL SCREENING: ICD-10-CM

## 2025-04-07 DIAGNOSIS — N93.9 ABNORMAL UTERINE BLEEDING (AUB): Primary | ICD-10-CM

## 2025-04-07 DIAGNOSIS — N93.9 ABNORMAL UTERINE BLEEDING (AUB): ICD-10-CM

## 2025-04-07 LAB
B-HCG UR QL: NEGATIVE
DEPRECATED RDW RBC AUTO: 41.7 FL (ref 37–54)
ERYTHROCYTE [DISTWIDTH] IN BLOOD BY AUTOMATED COUNT: 15.6 % (ref 12.3–15.4)
EXPIRATION DATE: NORMAL
HBA1C MFR BLD: 4.9 % (ref 4.8–5.6)
HCT VFR BLD AUTO: 35.4 % (ref 34–46.6)
HGB BLD-MCNC: 10.2 G/DL (ref 12–15.9)
INTERNAL NEGATIVE CONTROL: NEGATIVE
INTERNAL POSITIVE CONTROL: POSITIVE
Lab: NORMAL
MCH RBC QN AUTO: 21.9 PG (ref 26.6–33)
MCHC RBC AUTO-ENTMCNC: 28.8 G/DL (ref 31.5–35.7)
MCV RBC AUTO: 76 FL (ref 79–97)
PLATELET # BLD AUTO: 347 10*3/MM3 (ref 140–450)
PMV BLD AUTO: 10 FL (ref 6–12)
RBC # BLD AUTO: 4.66 10*6/MM3 (ref 3.77–5.28)
TSH SERPL DL<=0.05 MIU/L-ACNC: 0.79 UIU/ML (ref 0.27–4.2)
WBC NRBC COR # BLD AUTO: 5.91 10*3/MM3 (ref 3.4–10.8)

## 2025-04-07 PROCEDURE — 83036 HEMOGLOBIN GLYCOSYLATED A1C: CPT

## 2025-04-07 PROCEDURE — 85027 COMPLETE CBC AUTOMATED: CPT

## 2025-04-07 PROCEDURE — 84443 ASSAY THYROID STIM HORMONE: CPT

## 2025-04-07 RX ORDER — NORETHINDRONE 5 MG/1
5 TABLET ORAL DAILY
Qty: 90 TABLET | Refills: 3 | Status: SHIPPED | OUTPATIENT
Start: 2025-04-07

## 2025-04-08 LAB — REF LAB TEST METHOD: NORMAL

## 2025-04-24 NOTE — PROGRESS NOTES
"Subjective   Chief Complaint   Patient presents with    Follow-up     Pt is here to discuss polyp removal.     Judith Sandoval is a 49 y.o. year old .  No LMP recorded (lmp unknown).  She presents to discuss AUB and possible endometrial polyp removal. She states she has been compliant with the Aygestin and has gone up to BID dosing. She has not bled for about a week now. She has a HA today, but thinks it is weather related.     The following portions of the patient's history were reviewed and updated as appropriate:current medications, allergies, past medical history, and past surgical history    Social History    Tobacco Use      Smoking status: Former        Packs/day: 0.00        Types: Cigarettes        Quit date: 2016        Years since quittin.4      Smokeless tobacco: Never      Tobacco comments: smokes e cigarette         Objective   /80 (BP Location: Left arm, Patient Position: Sitting, Cuff Size: Adult)   Ht 165.1 cm (65\")   Wt 59.5 kg (131 lb 3.2 oz)   LMP  (LMP Unknown) Comment: Bled from 25 until 25.  Breastfeeding No   BMI 21.83 kg/m²     General:  well developed; well nourished  no acute distress  mentation appropriate   Lungs:  breathing is unlabored   Heart:  Not performed.     Lab Review   ENDOMETRIUM, BIOPSY:  Fragments of benign endometrium demonstrating \"exogenous progestin effect\"  Focal changes suggestive of possible polyp formation  Negative for atypical hyperplasia/malignancy     Imaging   Pelvic ultrasound report        Assessment   AUB, controlled on Aygestin   Suspected endometrial polyp      Plan   Extensively discussed AUB and possible polyp on endometrial biopsy.  Recommend removal for bleeding control and further endometrial sampling due to her age.  Patient voiced understanding, and is in agreement with plan of care.  Patient to continue Aygestin twice daily in the interim.  See below for full counseling details.   Today I discussed with " Judith the risks of her upcoming hysteroscopy with possible Myosure resection of intracavitary pathology. Risks reviewed included intraoperative bleeding, infection at the site of surgery and damage to the adjacent surrounding organs. Additionally, the small risk for reoperation in the event of unanticipated bleeding or surgical injury was discussed.  Finally we discussed the risks of cerebral and/or pulmonary edema related to excess absorption of the distending fluid and the small chance the procedure could not be completed if there was an excessive mismatch of fluid infused & fluid recovered.  All of her questions were answered fully.  She left with a very clear understanding of the preoperative surgical indications and the nature of the surgery for which she is scheduled.  She understands to be NPO after midnight and to be at the preoperative area ~ 1 hour prior to the scheduled surgical start time.  The importance of keeping all planned follow-up and taking all medications as prescribed was emphasized.  Follow up for scheduled procedure or sooner PRN     No orders of the defined types were placed in this encounter.         This note was electronically signed.    Jackie Ross MD   April 25, 2025

## 2025-04-25 ENCOUNTER — OFFICE VISIT (OUTPATIENT)
Dept: OBSTETRICS AND GYNECOLOGY | Facility: CLINIC | Age: 50
End: 2025-04-25
Payer: COMMERCIAL

## 2025-04-25 VITALS
WEIGHT: 131.2 LBS | SYSTOLIC BLOOD PRESSURE: 124 MMHG | BODY MASS INDEX: 21.86 KG/M2 | DIASTOLIC BLOOD PRESSURE: 80 MMHG | HEIGHT: 65 IN

## 2025-04-25 DIAGNOSIS — N92.0 MENORRHAGIA WITH REGULAR CYCLE: Primary | ICD-10-CM

## 2025-04-25 NOTE — Clinical Note
Please schedule for Hysteroscopy D&C with myosure in the [facility]. Thanks   Jackie Ross MD

## 2025-04-29 ENCOUNTER — TELEPHONE (OUTPATIENT)
Dept: OBSTETRICS AND GYNECOLOGY | Facility: CLINIC | Age: 50
End: 2025-04-29

## 2025-04-29 NOTE — TELEPHONE ENCOUNTER
Provider: DR. SULLIVAN    Caller: Judith Sandoval    Relationship to Patient: Self    Pharmacy:     Phone Number: [phone]    Reason for Call: APPOINTMENT    When was the patient last seen: 04.25.25    PATIENT CALLING IN BECAUSE SHE HAS NOT HEARD BACK ABOUT SCHEDULED FOR hysteroscopy.    PATIENT CAN BE REACHED [phone]    THANK YOU

## 2025-05-13 ENCOUNTER — OUTSIDE FACILITY SERVICE (OUTPATIENT)
Dept: OBSTETRICS AND GYNECOLOGY | Facility: CLINIC | Age: 50
End: 2025-05-13
Payer: COMMERCIAL

## 2025-05-13 ENCOUNTER — LAB REQUISITION (OUTPATIENT)
Dept: LAB | Facility: HOSPITAL | Age: 50
End: 2025-05-13
Payer: COMMERCIAL

## 2025-05-13 DIAGNOSIS — N92.0 EXCESSIVE AND FREQUENT MENSTRUATION WITH REGULAR CYCLE: ICD-10-CM

## 2025-05-13 PROCEDURE — 88305 TISSUE EXAM BY PATHOLOGIST: CPT | Performed by: STUDENT IN AN ORGANIZED HEALTH CARE EDUCATION/TRAINING PROGRAM

## 2025-05-13 RX ORDER — IBUPROFEN 600 MG/1
600 TABLET, FILM COATED ORAL EVERY 6 HOURS PRN
Qty: 60 TABLET | Refills: 1 | Status: SHIPPED | OUTPATIENT
Start: 2025-05-13

## 2025-05-13 RX ORDER — DOCUSATE SODIUM 100 MG/1
100 CAPSULE, LIQUID FILLED ORAL 2 TIMES DAILY
Qty: 60 CAPSULE | Refills: 1 | Status: SHIPPED | OUTPATIENT
Start: 2025-05-13

## 2025-05-14 LAB
CYTO UR: NORMAL
LAB AP CASE REPORT: NORMAL
LAB AP CLINICAL INFORMATION: NORMAL
PATH REPORT.FINAL DX SPEC: NORMAL
PATH REPORT.GROSS SPEC: NORMAL

## 2025-05-15 ENCOUNTER — RESULTS FOLLOW-UP (OUTPATIENT)
Dept: LAB | Facility: HOSPITAL | Age: 50
End: 2025-05-15
Payer: COMMERCIAL

## 2025-05-15 NOTE — TELEPHONE ENCOUNTER
Hub staff attempted to follow warm transfer process and was unsuccessful     Caller: Judith Sandoval    Relationship to patient: Self    Best call back number: [phone] / LVM    Patient is needing: PT RETURNING CALL

## 2025-05-21 ENCOUNTER — TELEPHONE (OUTPATIENT)
Dept: OBSTETRICS AND GYNECOLOGY | Facility: CLINIC | Age: 50
End: 2025-05-21

## 2025-05-21 NOTE — TELEPHONE ENCOUNTER
Caller: Judith Sandoval    Relationship to patient: Self    Best call back number: [phone]    Chief complaint: HAS TO PICK DAUGHTER UP AT AIRPORT     Type of visit: POST OP    Requested date: 5/27 MORNING APPT BEFORE 2:30     If rescheduling, when is the original appointment: 5/28

## 2025-05-27 ENCOUNTER — SPECIALTY PHARMACY (OUTPATIENT)
Dept: ONCOLOGY | Facility: HOSPITAL | Age: 50
End: 2025-05-27
Payer: COMMERCIAL

## 2025-05-27 NOTE — PROGRESS NOTES
"Subjective   Chief Complaint   Patient presents with    Post-op Follow-up     Post-op: Hyst D&C, polypectomy(25).  Pt states that she is still bleeding and has increased the Aygestin to 2 pills daily starting a couple of days ago.     Judith Sandoval is a 49 y.o. year old  presenting to be seen for her post-operative visit.  Currently she reports no problems with eating, bowel movements, voiding, and pain is well controlled. She reports continued bleeding, but did increase her Aygestin to 10mg a few days ago. She states her bleeding was never heavy after the procedure, but was still present. The bleeding now after going to 10mg dosing is old/brown and again light.     The results have previously been discussed with Judith.    The following portions of the patient's history were reviewed and updated as appropriate:current medications, allergies, past medical history, and past surgical history       Objective   /80 (BP Location: Left arm, Patient Position: Sitting, Cuff Size: Adult)   Ht 165.1 cm (65\")   Wt 58.9 kg (129 lb 12.8 oz)   LMP 2025 (Within Days)   Breastfeeding No   BMI 21.60 kg/m²     General:  well developed; well nourished  no acute distress  mentation appropriate   Pelvis: deferred     Final Diagnosis   ENDOMETRIAL CURETTAGE:  Polypoid fragments of benign appearing endometrium with extensive stromal pseudodecidualization suggestive of exogenous hormone effect.  No hyperplasia or atypia identified.        Assessment   S/P hysteroscopy with Myosure     Plan   May return to full activity with no restrictions.  Continue the Aygestin 10mg po. Will follow up in 3 months for bleeding check or sooner PRN   The importance of keeping all planned follow-up and taking all medications as prescribed was emphasized.      New Medications Ordered This Visit   Medications    norethindrone (Aygestin) 5 MG tablet     Sig: Take 2 tablets by mouth Daily.     Dispense:  180 tablet     Refill: "  3          This note was electronically signed.    Jackie Ross MD .  May 28, 2025

## 2025-05-28 ENCOUNTER — TELEPHONE (OUTPATIENT)
Dept: OBSTETRICS AND GYNECOLOGY | Facility: CLINIC | Age: 50
End: 2025-05-28

## 2025-05-28 ENCOUNTER — OFFICE VISIT (OUTPATIENT)
Dept: OBSTETRICS AND GYNECOLOGY | Facility: CLINIC | Age: 50
End: 2025-05-28
Payer: COMMERCIAL

## 2025-05-28 VITALS
HEIGHT: 65 IN | BODY MASS INDEX: 21.63 KG/M2 | WEIGHT: 129.8 LBS | SYSTOLIC BLOOD PRESSURE: 122 MMHG | DIASTOLIC BLOOD PRESSURE: 80 MMHG

## 2025-05-28 DIAGNOSIS — N93.9 ABNORMAL UTERINE BLEEDING (AUB): ICD-10-CM

## 2025-05-28 DIAGNOSIS — Z98.890 STATUS POST HYSTEROSCOPY: Primary | ICD-10-CM

## 2025-05-28 RX ORDER — LEVOCETIRIZINE DIHYDROCHLORIDE 5 MG/1
5 TABLET, FILM COATED ORAL EVERY EVENING
Qty: 30 TABLET | Refills: 11 | OUTPATIENT
Start: 2025-05-28

## 2025-05-28 RX ORDER — NORETHINDRONE 5 MG/1
10 TABLET ORAL DAILY
Qty: 180 TABLET | Refills: 3 | Status: SHIPPED | OUTPATIENT
Start: 2025-05-28

## 2025-05-28 RX ORDER — CLOBETASOL PROPIONATE 0.5 MG/ML
SOLUTION TOPICAL
COMMUNITY
Start: 2025-05-20

## 2025-05-28 NOTE — TELEPHONE ENCOUNTER
PT CALLING DR SULLIVAN WAS CALLING A REFILL BUT WAS CHANGING FROM 5MG TO 10MG - PHARMACY ADVISED WAS CALLED AS 5MG

## 2025-05-28 NOTE — TELEPHONE ENCOUNTER
Allie at [facility] was able to get the prescription to go through with the therapy change. She will be filling at this time. Pt informed and demonstrates appreciation.

## 2025-06-27 ENCOUNTER — TELEPHONE (OUTPATIENT)
Dept: NEUROLOGY | Facility: CLINIC | Age: 50
End: 2025-06-27

## 2025-06-27 NOTE — TELEPHONE ENCOUNTER
Caller: Judith Sandoval    Relationship to patient: Self      Best call back number:   Telephone Information:   Mobile [phone]     Provider: HOWIE    Medication PA needed:   ubrogepant (Ubrelvy) 100 MG tablet     Reason for call/Prior Auth:   PT ONLY HAS ONE PILL LEFT AND SHE IS BEING TOLD BY THE [facility] ON [facility] THAT A PRIOR AUTH IS NEEDED.    PLEASE LET THE PT KNOW WHEN THIS HAS BEEN APPROVED.

## 2025-08-08 ENCOUNTER — TELEPHONE (OUTPATIENT)
Facility: HOSPITAL | Age: 50
End: 2025-08-08
Payer: COMMERCIAL

## 2025-08-11 ENCOUNTER — TELEPHONE (OUTPATIENT)
Facility: HOSPITAL | Age: 50
End: 2025-08-11
Payer: COMMERCIAL

## (undated) DEVICE — BLANKT WARM UPPR/BDY ARM/OUT 57X196CM

## (undated) DEVICE — GW ZIPWIRE STD/SHFT STR JB/8CM .035X150CM

## (undated) DEVICE — GLV SURG SENSICARE W/ALOE PF LF 7.5 STRL

## (undated) DEVICE — DRSNG SURESITE WNDW 2.38X2.75

## (undated) DEVICE — SYR LL TP 10ML STRL

## (undated) DEVICE — PK CYSTO-TUR BASIC 10

## (undated) DEVICE — SYR LUERLOK 30CC